# Patient Record
Sex: MALE | Race: WHITE | NOT HISPANIC OR LATINO | Employment: OTHER | URBAN - METROPOLITAN AREA
[De-identification: names, ages, dates, MRNs, and addresses within clinical notes are randomized per-mention and may not be internally consistent; named-entity substitution may affect disease eponyms.]

---

## 2018-06-08 ENCOUNTER — APPOINTMENT (OUTPATIENT)
Dept: PREADMISSION TESTING | Facility: HOSPITAL | Age: 68
End: 2018-06-08

## 2018-06-10 ENCOUNTER — APPOINTMENT (OUTPATIENT)
Dept: PREADMISSION TESTING | Facility: HOSPITAL | Age: 68
End: 2018-06-10

## 2018-06-10 ENCOUNTER — HOSPITAL ENCOUNTER (OUTPATIENT)
Dept: GENERAL RADIOLOGY | Facility: HOSPITAL | Age: 68
Discharge: HOME | End: 2018-06-10
Admitting: ORTHOPAEDIC SURGERY

## 2018-06-10 VITALS — HEIGHT: 72 IN | WEIGHT: 220.68 LBS | BODY MASS INDEX: 29.89 KG/M2

## 2018-06-10 LAB
ANION GAP SERPL CALCULATED.3IONS-SCNC: 5 MMOL/L (ref 3–11)
BUN BLD-MCNC: 13 MG/DL (ref 9–23)
BUN/CREAT SERPL: 14.8 (ref 7–25)
CALCIUM SPEC-SCNC: 9 MG/DL (ref 8.7–10.4)
CHLORIDE SERPL-SCNC: 105 MMOL/L (ref 99–109)
CO2 SERPL-SCNC: 32 MMOL/L (ref 20–31)
CREAT BLD-MCNC: 0.88 MG/DL (ref 0.6–1.3)
DEPRECATED RDW RBC AUTO: 45 FL (ref 37–54)
ERYTHROCYTE [DISTWIDTH] IN BLOOD BY AUTOMATED COUNT: 12.8 % (ref 11.3–14.5)
GFR SERPL CREATININE-BSD FRML MDRD: 86 ML/MIN/1.73
GLUCOSE BLD-MCNC: 104 MG/DL (ref 70–100)
HCT VFR BLD AUTO: 44.4 % (ref 38.9–50.9)
HGB BLD-MCNC: 15 G/DL (ref 13.1–17.5)
MCH RBC QN AUTO: 32.3 PG (ref 27–31)
MCHC RBC AUTO-ENTMCNC: 33.8 G/DL (ref 32–36)
MCV RBC AUTO: 95.7 FL (ref 80–99)
PLATELET # BLD AUTO: 240 10*3/MM3 (ref 150–450)
PMV BLD AUTO: 9.4 FL (ref 6–12)
POTASSIUM BLD-SCNC: 4.1 MMOL/L (ref 3.5–5.5)
RBC # BLD AUTO: 4.64 10*6/MM3 (ref 4.2–5.76)
SODIUM BLD-SCNC: 142 MMOL/L (ref 132–146)
WBC NRBC COR # BLD: 5.25 10*3/MM3 (ref 3.5–10.8)

## 2018-06-10 PROCEDURE — 71046 X-RAY EXAM CHEST 2 VIEWS: CPT

## 2018-06-10 PROCEDURE — 85027 COMPLETE CBC AUTOMATED: CPT | Performed by: ORTHOPAEDIC SURGERY

## 2018-06-10 PROCEDURE — 93005 ELECTROCARDIOGRAM TRACING: CPT

## 2018-06-10 PROCEDURE — 93010 ELECTROCARDIOGRAM REPORT: CPT | Performed by: INTERNAL MEDICINE

## 2018-06-10 PROCEDURE — 36415 COLL VENOUS BLD VENIPUNCTURE: CPT

## 2018-06-10 PROCEDURE — 80048 BASIC METABOLIC PNL TOTAL CA: CPT | Performed by: ORTHOPAEDIC SURGERY

## 2018-06-10 RX ORDER — ATORVASTATIN CALCIUM 20 MG/1
20 TABLET, FILM COATED ORAL DAILY
COMMUNITY

## 2018-06-10 RX ORDER — HYDROCODONE BITARTRATE AND ACETAMINOPHEN 5; 325 MG/1; MG/1
1 TABLET ORAL 2 TIMES DAILY
COMMUNITY
End: 2018-06-12 | Stop reason: HOSPADM

## 2018-06-10 RX ORDER — ENTACAPONE 200 MG/1
200 TABLET ORAL
COMMUNITY

## 2018-06-10 RX ORDER — QUETIAPINE FUMARATE 25 MG/1
125 TABLET, FILM COATED ORAL NIGHTLY
COMMUNITY

## 2018-06-10 RX ORDER — DONEPEZIL HYDROCHLORIDE 10 MG/1
10 TABLET, FILM COATED ORAL NIGHTLY
COMMUNITY

## 2018-06-10 NOTE — DISCHARGE INSTRUCTIONS

## 2018-06-12 ENCOUNTER — APPOINTMENT (OUTPATIENT)
Dept: GENERAL RADIOLOGY | Facility: HOSPITAL | Age: 68
End: 2018-06-12

## 2018-06-12 ENCOUNTER — ANESTHESIA (OUTPATIENT)
Dept: PERIOP | Facility: HOSPITAL | Age: 68
End: 2018-06-12

## 2018-06-12 ENCOUNTER — ANESTHESIA EVENT (OUTPATIENT)
Dept: PERIOP | Facility: HOSPITAL | Age: 68
End: 2018-06-12

## 2018-06-12 ENCOUNTER — HOSPITAL ENCOUNTER (OUTPATIENT)
Facility: HOSPITAL | Age: 68
Setting detail: HOSPITAL OUTPATIENT SURGERY
Discharge: HOME | End: 2018-06-12
Attending: ORTHOPAEDIC SURGERY | Admitting: ORTHOPAEDIC SURGERY

## 2018-06-12 VITALS
OXYGEN SATURATION: 93 % | SYSTOLIC BLOOD PRESSURE: 129 MMHG | BODY MASS INDEX: 29.8 KG/M2 | TEMPERATURE: 98.5 F | WEIGHT: 220 LBS | RESPIRATION RATE: 18 BRPM | DIASTOLIC BLOOD PRESSURE: 73 MMHG | HEART RATE: 70 BPM | HEIGHT: 72 IN

## 2018-06-12 DIAGNOSIS — G20 PARKINSON'S DISEASE (HCC): Primary | ICD-10-CM

## 2018-06-12 DIAGNOSIS — S52.92XA CLOSED FRACTURE OF LEFT FOREARM, INITIAL ENCOUNTER: ICD-10-CM

## 2018-06-12 PROCEDURE — 76001 HC FLUORO GREATER THAN 1 HOUR: CPT

## 2018-06-12 PROCEDURE — C1713 ANCHOR/SCREW BN/BN,TIS/BN: HCPCS | Performed by: ORTHOPAEDIC SURGERY

## 2018-06-12 PROCEDURE — 25010000003 CEFAZOLIN IN DEXTROSE 2-4 GM/100ML-% SOLUTION: Performed by: ORTHOPAEDIC SURGERY

## 2018-06-12 PROCEDURE — 25010000002 DEXAMETHASONE SODIUM PHOSPHATE 10 MG/ML SOLUTION 1 ML VIAL: Performed by: NURSE ANESTHETIST, CERTIFIED REGISTERED

## 2018-06-12 PROCEDURE — 25010000002 BUPRENORPHINE PER 0.1 MG: Performed by: NURSE ANESTHETIST, CERTIFIED REGISTERED

## 2018-06-12 PROCEDURE — 25010000002 ONDANSETRON PER 1 MG: Performed by: NURSE ANESTHETIST, CERTIFIED REGISTERED

## 2018-06-12 PROCEDURE — 76000 FLUOROSCOPY <1 HR PHYS/QHP: CPT

## 2018-06-12 PROCEDURE — 25010000002 PROPOFOL 10 MG/ML EMULSION: Performed by: NURSE ANESTHETIST, CERTIFIED REGISTERED

## 2018-06-12 PROCEDURE — C1769 GUIDE WIRE: HCPCS | Performed by: ORTHOPAEDIC SURGERY

## 2018-06-12 PROCEDURE — 25010000002 FENTANYL CITRATE (PF) 100 MCG/2ML SOLUTION: Performed by: NURSE ANESTHETIST, CERTIFIED REGISTERED

## 2018-06-12 PROCEDURE — 25010000002 DEXAMETHASONE PER 1 MG: Performed by: NURSE ANESTHETIST, CERTIFIED REGISTERED

## 2018-06-12 DEVICE — SCRW CORT S/TAP 3.5X20MM: Type: IMPLANTABLE DEVICE | Site: RADIUS | Status: FUNCTIONAL

## 2018-06-12 DEVICE — ALLOGRFT BONE VIVIGEN CELLUAR MATRX FORMABLE 1CC: Type: IMPLANTABLE DEVICE | Site: ARM | Status: FUNCTIONAL

## 2018-06-12 DEVICE — PLT LCP 8HL 3.5X111MM: Type: IMPLANTABLE DEVICE | Site: RADIUS | Status: FUNCTIONAL

## 2018-06-12 DEVICE — SCRW CORT S/TAP 3.5X18MM: Type: IMPLANTABLE DEVICE | Site: RADIUS | Status: FUNCTIONAL

## 2018-06-12 DEVICE — SCRW CORT S/TAP 3.5X16MM: Type: IMPLANTABLE DEVICE | Site: RADIUS | Status: FUNCTIONAL

## 2018-06-12 RX ORDER — EPHEDRINE SULFATE 50 MG/ML
5 INJECTION, SOLUTION INTRAVENOUS ONCE AS NEEDED
Status: DISCONTINUED | OUTPATIENT
Start: 2018-06-12 | End: 2018-06-12 | Stop reason: HOSPADM

## 2018-06-12 RX ORDER — LIDOCAINE HYDROCHLORIDE 10 MG/ML
INJECTION, SOLUTION EPIDURAL; INFILTRATION; INTRACAUDAL; PERINEURAL AS NEEDED
Status: DISCONTINUED | OUTPATIENT
Start: 2018-06-12 | End: 2018-06-12 | Stop reason: SURG

## 2018-06-12 RX ORDER — LABETALOL HYDROCHLORIDE 5 MG/ML
5 INJECTION, SOLUTION INTRAVENOUS
Status: DISCONTINUED | OUTPATIENT
Start: 2018-06-12 | End: 2018-06-12 | Stop reason: HOSPADM

## 2018-06-12 RX ORDER — OXYCODONE HYDROCHLORIDE AND ACETAMINOPHEN 5; 325 MG/1; MG/1
1 TABLET ORAL ONCE AS NEEDED
Status: DISCONTINUED | OUTPATIENT
Start: 2018-06-12 | End: 2018-06-12 | Stop reason: HOSPADM

## 2018-06-12 RX ORDER — NALOXONE HCL 0.4 MG/ML
0.4 VIAL (ML) INJECTION AS NEEDED
Status: DISCONTINUED | OUTPATIENT
Start: 2018-06-12 | End: 2018-06-12 | Stop reason: HOSPADM

## 2018-06-12 RX ORDER — OXYCODONE HYDROCHLORIDE 5 MG/1
5-10 TABLET ORAL EVERY 4 HOURS PRN
Qty: 40 TABLET | Refills: 0 | Status: ON HOLD | OUTPATIENT
Start: 2018-06-12 | End: 2018-07-11 | Stop reason: SDUPTHER

## 2018-06-12 RX ORDER — ACETAMINOPHEN 325 MG/1
650 TABLET ORAL ONCE
Status: COMPLETED | OUTPATIENT
Start: 2018-06-12 | End: 2018-06-12

## 2018-06-12 RX ORDER — LIDOCAINE HYDROCHLORIDE 10 MG/ML
0.5 INJECTION, SOLUTION EPIDURAL; INFILTRATION; INTRACAUDAL; PERINEURAL ONCE AS NEEDED
Status: COMPLETED | OUTPATIENT
Start: 2018-06-12 | End: 2018-06-12

## 2018-06-12 RX ORDER — ONDANSETRON 4 MG/1
4 TABLET, FILM COATED ORAL EVERY 8 HOURS PRN
Qty: 12 TABLET | Refills: 0 | Status: SHIPPED | OUTPATIENT
Start: 2018-06-12

## 2018-06-12 RX ORDER — MEPERIDINE HYDROCHLORIDE 25 MG/ML
12.5 INJECTION INTRAMUSCULAR; INTRAVENOUS; SUBCUTANEOUS
Status: DISCONTINUED | OUTPATIENT
Start: 2018-06-12 | End: 2018-06-12 | Stop reason: HOSPADM

## 2018-06-12 RX ORDER — ONDANSETRON 2 MG/ML
INJECTION INTRAMUSCULAR; INTRAVENOUS AS NEEDED
Status: DISCONTINUED | OUTPATIENT
Start: 2018-06-12 | End: 2018-06-12 | Stop reason: SURG

## 2018-06-12 RX ORDER — CEFAZOLIN SODIUM 2 G/100ML
2 INJECTION, SOLUTION INTRAVENOUS ONCE
Status: COMPLETED | OUTPATIENT
Start: 2018-06-12 | End: 2018-06-12

## 2018-06-12 RX ORDER — DEXAMETHASONE SODIUM PHOSPHATE 4 MG/ML
INJECTION, SOLUTION INTRA-ARTICULAR; INTRALESIONAL; INTRAMUSCULAR; INTRAVENOUS; SOFT TISSUE AS NEEDED
Status: DISCONTINUED | OUTPATIENT
Start: 2018-06-12 | End: 2018-06-12 | Stop reason: SURG

## 2018-06-12 RX ORDER — SODIUM CHLORIDE, SODIUM LACTATE, POTASSIUM CHLORIDE, CALCIUM CHLORIDE 600; 310; 30; 20 MG/100ML; MG/100ML; MG/100ML; MG/100ML
9 INJECTION, SOLUTION INTRAVENOUS CONTINUOUS PRN
Status: DISCONTINUED | OUTPATIENT
Start: 2018-06-12 | End: 2018-06-12 | Stop reason: HOSPADM

## 2018-06-12 RX ORDER — PROPOFOL 10 MG/ML
VIAL (ML) INTRAVENOUS AS NEEDED
Status: DISCONTINUED | OUTPATIENT
Start: 2018-06-12 | End: 2018-06-12 | Stop reason: SURG

## 2018-06-12 RX ORDER — MAGNESIUM HYDROXIDE 1200 MG/15ML
LIQUID ORAL AS NEEDED
Status: DISCONTINUED | OUTPATIENT
Start: 2018-06-12 | End: 2018-06-12 | Stop reason: HOSPADM

## 2018-06-12 RX ORDER — ACETAMINOPHEN 325 MG/1
650 TABLET ORAL EVERY 4 HOURS PRN
Qty: 50 TABLET | Refills: 0 | Status: SHIPPED | OUTPATIENT
Start: 2018-06-12

## 2018-06-12 RX ORDER — DOCUSATE SODIUM 100 MG/1
100 CAPSULE, LIQUID FILLED ORAL 2 TIMES DAILY PRN
Qty: 20 CAPSULE | Refills: 0 | Status: SHIPPED | OUTPATIENT
Start: 2018-06-12

## 2018-06-12 RX ORDER — ONDANSETRON 2 MG/ML
4 INJECTION INTRAMUSCULAR; INTRAVENOUS ONCE AS NEEDED
Status: DISCONTINUED | OUTPATIENT
Start: 2018-06-12 | End: 2018-06-12 | Stop reason: HOSPADM

## 2018-06-12 RX ORDER — FENTANYL CITRATE 50 UG/ML
INJECTION, SOLUTION INTRAMUSCULAR; INTRAVENOUS AS NEEDED
Status: DISCONTINUED | OUTPATIENT
Start: 2018-06-12 | End: 2018-06-12 | Stop reason: SURG

## 2018-06-12 RX ORDER — SODIUM CHLORIDE 0.9 % (FLUSH) 0.9 %
1-10 SYRINGE (ML) INJECTION AS NEEDED
Status: DISCONTINUED | OUTPATIENT
Start: 2018-06-12 | End: 2018-06-12 | Stop reason: HOSPADM

## 2018-06-12 RX ORDER — PREGABALIN 75 MG/1
75 CAPSULE ORAL ONCE
Status: COMPLETED | OUTPATIENT
Start: 2018-06-12 | End: 2018-06-12

## 2018-06-12 RX ORDER — FENTANYL CITRATE 50 UG/ML
50 INJECTION, SOLUTION INTRAMUSCULAR; INTRAVENOUS
Status: DISCONTINUED | OUTPATIENT
Start: 2018-06-12 | End: 2018-06-12 | Stop reason: HOSPADM

## 2018-06-12 RX ORDER — SODIUM CHLORIDE, SODIUM LACTATE, POTASSIUM CHLORIDE, CALCIUM CHLORIDE 600; 310; 30; 20 MG/100ML; MG/100ML; MG/100ML; MG/100ML
100 INJECTION, SOLUTION INTRAVENOUS CONTINUOUS
Status: DISCONTINUED | OUTPATIENT
Start: 2018-06-12 | End: 2018-06-12 | Stop reason: HOSPADM

## 2018-06-12 RX ORDER — MELOXICAM 7.5 MG/1
15 TABLET ORAL ONCE
Status: COMPLETED | OUTPATIENT
Start: 2018-06-12 | End: 2018-06-12

## 2018-06-12 RX ORDER — HYDROMORPHONE HYDROCHLORIDE 1 MG/ML
0.5 INJECTION, SOLUTION INTRAMUSCULAR; INTRAVENOUS; SUBCUTANEOUS
Status: DISCONTINUED | OUTPATIENT
Start: 2018-06-12 | End: 2018-06-12 | Stop reason: HOSPADM

## 2018-06-12 RX ORDER — FAMOTIDINE 20 MG/1
20 TABLET, FILM COATED ORAL
Status: DISCONTINUED | OUTPATIENT
Start: 2018-06-12 | End: 2018-06-12 | Stop reason: HOSPADM

## 2018-06-12 RX ADMIN — EPHEDRINE SULFATE 10 MG: 50 INJECTION INTRAMUSCULAR; INTRAVENOUS; SUBCUTANEOUS at 13:15

## 2018-06-12 RX ADMIN — PROPOFOL 150 MG: 10 INJECTION, EMULSION INTRAVENOUS at 11:40

## 2018-06-12 RX ADMIN — MELOXICAM 15 MG: 7.5 TABLET ORAL at 09:16

## 2018-06-12 RX ADMIN — CEFAZOLIN SODIUM 2 G: 2 INJECTION, SOLUTION INTRAVENOUS at 15:33

## 2018-06-12 RX ADMIN — FENTANYL CITRATE 100 MCG: 50 INJECTION, SOLUTION INTRAMUSCULAR; INTRAVENOUS at 11:40

## 2018-06-12 RX ADMIN — SODIUM CHLORIDE, POTASSIUM CHLORIDE, SODIUM LACTATE AND CALCIUM CHLORIDE: 600; 310; 30; 20 INJECTION, SOLUTION INTRAVENOUS at 14:10

## 2018-06-12 RX ADMIN — PREGABALIN 75 MG: 75 CAPSULE ORAL at 09:17

## 2018-06-12 RX ADMIN — CEFAZOLIN SODIUM 2 G: 2 INJECTION, SOLUTION INTRAVENOUS at 11:33

## 2018-06-12 RX ADMIN — DEXAMETHASONE SODIUM PHOSPHATE 4 MG: 4 INJECTION, SOLUTION INTRAMUSCULAR; INTRAVENOUS at 15:20

## 2018-06-12 RX ADMIN — SODIUM CHLORIDE, POTASSIUM CHLORIDE, SODIUM LACTATE AND CALCIUM CHLORIDE 9 ML/HR: 600; 310; 30; 20 INJECTION, SOLUTION INTRAVENOUS at 09:17

## 2018-06-12 RX ADMIN — ACETAMINOPHEN 650 MG: 500 TABLET, FILM COATED ORAL at 09:16

## 2018-06-12 RX ADMIN — LIDOCAINE HYDROCHLORIDE 0.5 ML: 10 INJECTION, SOLUTION EPIDURAL; INFILTRATION; INTRACAUDAL; PERINEURAL at 09:16

## 2018-06-12 RX ADMIN — ONDANSETRON 4 MG: 2 INJECTION INTRAMUSCULAR; INTRAVENOUS at 15:20

## 2018-06-12 RX ADMIN — DEXAMETHASONE SODIUM PHOSPHATE 25.7 ML: 10 INJECTION, SOLUTION INTRAMUSCULAR; INTRAVENOUS at 10:28

## 2018-06-12 RX ADMIN — LIDOCAINE HYDROCHLORIDE 30 MG: 10 INJECTION, SOLUTION EPIDURAL; INFILTRATION; INTRACAUDAL; PERINEURAL at 11:40

## 2018-06-12 RX ADMIN — FENTANYL CITRATE 100 MCG: 50 INJECTION, SOLUTION INTRAMUSCULAR; INTRAVENOUS at 10:28

## 2018-06-12 RX ADMIN — FAMOTIDINE 20 MG: 20 TABLET ORAL at 09:17

## 2018-06-12 NOTE — H&P
Patient Care Team:      Chief complaint: arm pain     Subjective:    Patient is a 67 y.o.male presents with left arm problem that began suddenly on June 4, 2018 and occurs intermittently. . The pain is related to a fall. He is here today for surgical intervention with Dr. Williamson.    Review of Systems:  General ROS: negative for - chills, fatigue or fever  Cardiovascular ROS: no chest pain or dyspnea on exertion  Respiratory ROS: no cough, shortness of breath, or wheezing      Allergies:   Allergies   Allergen Reactions   • Shellfish-Derived Products Other (See Comments)     DESCRIBES IT AS A TINGLING IN HIS MOUTH - NO ASSOCIATED SWELLING     • Shrimp (Diagnostic) Other (See Comments)     DESCRIBES IT AS A TINGLING IN HIS MOUTH - NO ASSOCIATED SWELLING  IODINE AND CONTRAST CAUSES NO PROBLEMS            Latex: Denies  Contrast Dye: No but does have shellfish allergy    Home Meds    Prescriptions Prior to Admission   Medication Sig Dispense Refill Last Dose   • atorvastatin (LIPITOR) 20 MG tablet Take 20 mg by mouth Daily.   6/11/2018 at Unknown time   • carbidopa-levodopa (SINEMET)  MG per tablet Take 3.5 tablets by mouth 3 (Three) Times a Day.   6/12/2018 at Unknown time   • donepezil (ARICEPT) 10 MG tablet Take 10 mg by mouth Every Night.   6/12/2018 at Unknown time   • entacapone (COMTAN) 200 MG tablet Take 200 mg by mouth 5 (Five) Times a Day.   6/12/2018 at Unknown time   • HYDROcodone-acetaminophen (NORCO) 5-325 MG per tablet Take 1 tablet by mouth 2 (Two) Times a Day.   6/11/2018 at Unknown time   • Multiple Vitamins-Minerals (MULTIVITAL-M PO) Take 1 tablet by mouth Daily.   6/12/2018 at Unknown time   • QUEtiapine (SEROquel) 25 MG tablet Take 125 mg by mouth Every Night.   6/11/2018 at Unknown time     PMH:   Past Medical History:   Diagnosis Date   • Arthritis    • Elevated cholesterol    • EBTI (obstructive sleep apnea)     NO CPAP   • Parkinson disease    • Wears glasses      PSH:    Past Surgical  "History:   Procedure Laterality Date   • COLONOSCOPY     • CYST REMOVAL      ON BACK   • DEEP BRAIN STIMULATOR PLACEMENT      placed to increase dopamine production to help reduce tremors   • HAND SURGERY Left    • OTHER SURGICAL HISTORY      DBT BATTERY CHANGE - battery placed on left chest   • WISDOM TOOTH EXTRACTION       Immunization History: pneumo: No  Flu: 2017  Tetanus: No  Social History:   Tobacco: Never    Alcohol: No      Physical Exam:/76 (BP Location: Right arm, Patient Position: Lying)   Pulse 58   Temp 98.1 °F (36.7 °C) (Tympanic)   Resp 18   Ht 182.9 cm (72\")   Wt 99.8 kg (220 lb)   SpO2 97%   BMI 29.84 kg/m²       General Appearance:    Alert, cooperative, no distress, appears stated age   Head:    Normocephalic, without obvious abnormality, atraumatic   Lungs:     Clear to auscultation bilaterally, respirations unlabored    Heart: Regular rate and rhythm, S1 and S2 normal, no murmur, rub    or gallop    Abdomen:    Soft without tenderness   Breast Exam:    deferred   Genitalia:    deferred   Extremities:   Extremities normal, atraumatic, no cyanosis or edema   Skin:   Skin color, texture, turgor normal, no rashes or lesions   Neurologic:   Grossly intact     Results Review: Labs were reviewed       Impression: Galeazzi's fracture of left radius    Plan: Radius shaft ORIF     SARAY Escobedo 6/12/2018 10:19 AM    Seen and agree with above note. Patient to undergo ORIF L radial shaft with evaluation and possible pinning of DRUJ. NPO. Posted/consented/marked. To OR today.      "

## 2018-06-12 NOTE — ANESTHESIA POSTPROCEDURE EVALUATION
Patient: Sebastián Price    Procedure Summary     Date:  06/12/18 Room / Location:   [facility]    Anesthesia Start:  1133 Anesthesia Stop:      Procedure:  RADIUS SHAFT OPEN REDUCTION INTERNAL FIXATION LEFT, FELICIA J PINNING (Left Hand) Diagnosis:      Surgeon:  Kristian Williamson Jr., MD Provider:  Carlos Rijoas MD    Anesthesia Type:  general, regional ASA Status:  3          Anesthesia Type: general, regional  Last vitals  BP   125/76 (06/12/18 0908)126/74   Temp   98.1 °F (36.7 °C) (06/12/18 0908)99   Pulse   58 (06/12/18 0908)75   Resp   18 (06/12/18 0908)  18   SpO2   97 % (06/12/18 0908)94     Post Anesthesia Care and Evaluation    Patient location during evaluation: PACU  Patient participation: complete - patient participated  Level of consciousness: awake and alert  Pain score: 0  Pain management: adequate  Airway patency: patent  Anesthetic complications: No anesthetic complications  PONV Status: none  Cardiovascular status: hemodynamically stable and acceptable  Respiratory status: nonlabored ventilation, acceptable and nasal cannula  Hydration status: acceptable

## 2018-06-12 NOTE — ANESTHESIA PROCEDURE NOTES
Airway  Urgency: elective    Airway not difficult    General Information and Staff    Patient location during procedure: OR  Anesthesiologist: MICHELLE SCOTT  CRNA: MICHELLE BAKER    Indications and Patient Condition  Indications for airway management: airway protection    Preoxygenated: yes  Mask difficulty assessment: 1 - vent by mask    Final Airway Details  Final airway type: supraglottic airway      Successful airway: I-gel  Size 4    Number of attempts at approach: 1    Additional Comments  LMA placed without difficulty, ventilation with assist, equal breath sounds and symmetric chest rise and fall

## 2018-06-12 NOTE — ANESTHESIA PROCEDURE NOTES
Peripheral Block    Patient location during procedure: pre-op  Reason for block: at surgeon's request and post-op pain management  Performed by  Anesthesiologist: MICHELLE SCOTT  Assisted by: MATT JAY  Preanesthetic Checklist  Completed: patient identified, site marked, surgical consent, pre-op evaluation, timeout performed, IV checked, risks and benefits discussed and monitors and equipment checked  Prep:  Sterile barriers:cap, gloves, mask and sterile barriers  Prep: ChloraPrep  Patient monitoring: blood pressure monitoring, continuous pulse oximetry and EKG  Procedure  Sedation:yes    Guidance:ultrasound guided  Images:still images obtained    Laterality:left  Block Type:infraclavicular  Injection Technique:single-shot  Needle Type:Tuohy  Needle Gauge:18 G    Catheter Size:20 G  Medications  Comment:Decadron 2mg  Buprenex 0.15mg  Local Injected:bupivacaine 0.25% Local Amount Injected:25mL  Post Assessment  Injection Assessment: negative aspiration for heme, no paresthesia on injection and incremental injection  Patient Tolerance:comfortable throughout block  Complications:no  Additional Notes  Procedure:                                                 The affected upper extremity was adducted within the patients ROM.  The US probe was placed approximately at the distal inferior third of the clavicle in a cephalad to caudad orientation.  The brachial plexus, subclavian artery and vein where visualized and the patient was marked and sterile prep and drape where completed.  The pt was anesthetized with  IV Sedation( see meds).  Skin and cutaneous tissue was infiltrated and anesthetized with 1% Lidocaine 3 mls via a 25g needle.   A 4 inch B-Menon echogenic Touhy 360 degree needle was placed inferiorly to the clavicle in a caudad direction, in plane US technique.  The needle was visualized as it passed through Pectoralis Major and to the posterior aspect of the artery where LA was placed and spread was  visualized. Injection of LA was incremental, and negative aspiration every 5 MLs.  Injection pressure was also noted as minimal and patient denied pain on injection.   A 20 gauge catheter was then placed through the needle and positioned and location was confirmed with injection of LA.  Thank you

## 2018-06-12 NOTE — PERIOPERATIVE NURSING NOTE
Called Case management, spoke with Macrina White about Home Health referral order. Per Macrina, someone will call patient tomorrow once they have looked over insurance. RN will notify family upon discharge.

## 2018-06-13 NOTE — OP NOTE
ULNA/RADIUS OPEN REDUCTION INTERNAL FIXATION  Procedure Report    Patient Name:  Sebastián Price  YOB: 1950    Date of Surgery:  6/12/2018     Indications:  This is a 67-year-old male with history of Parkinson's disease who presents after having a nightmare and jumping out of his bed and falling onto an outstretched left upper extremity with resultant left forearm and wrist pain.  He denied any elbow pain.  He was seen at an outside hospital and orthopedist who referred him to me for further evaluation.  X-rays and exam were consistent with a left Galeazzi-type radial shaft fracture with disruption of the DRUJ.  We discussed the risk and benefits of performing an open reduction internal  Fixation of his left radial shaft with evaluation of his DRUJ and possible pinning.  Nonoperative treatments were discussed, but due to his significant pain and dysfunction, he wished to proceed with operative management. The risks and benefits were discussed with the patient to include: bleeding, infection, nerve injury, failure of fixation, need for further procedures, loss of limb or life. The patient expressed his understanding and   Wished to proceed.      Pre-op Diagnosis:  Left radius Galeazzi fracture    S52.372A  Left DRUJ disruption S63.592A    Post-op Diagnosis:       same    Procedure/CPT® Codes: 70140      Procedure(s):  RADIUS SHAFT OPEN REDUCTION INTERNAL FIXATION LEFT, DRUJ PINNING    Staff:  Surgeon(s):  Kristian Williamson Jr., MD         Anesthesia: General with Block    Estimated Blood Loss: 125 mL    Implants:      Implant Name Type Inv. Item Serial No.  Lot No. LRB No. Used   ALLOGRFT BONE VIVIGEN CELLUAR MATRX FORMABLE 1CC - [id number] Implant ALLOGRFT BONE VIVIGEN CELLUAR MATRX FORMABLE 1CC  [employer]  Left 1   PLT LCP 8HL [id number] - [id number] Implant PLT LCP 8HL [id number]  [employer]  Left 1   SCRW EB S/TAP 3.5X16MM - [id number] Implant SCRW EB S/TAP 3.5X16MM  DEPUY  [employer]  Left 2   SCRW EB S/TAP 3.5X18MM - [id number] Implant SCRW EB S/TAP 3.5X18MM  DEPU[employer]  Left 2   SCRW EB S/TAP 3.5X20MM - [id number] Implant SCRW EB S/TAP 3.5X20MM   DEPU[employer]   Left 2       Specimen:                None      Findings: comminuted radial shaft fracture with gross instability of the DRUJ after fixation of the radial shaft fracture    Total tourniquet time: 2 hours    Complications: none apparent    Description of Procedure: After informed consent had been obtained in the left upper extremity had been marked, the patient receives an infraclavicular nerve block.  He was then taken back to the operating suite And placed on the operating table with a hand table extension.  The patient then underwent general anesthesia and was intubated.  A nonsterile tourniquet was placed to the left upper arm.We then prepped and draped the left upper extremity in the usual, sterile fashion.A timeout was then performed with the entire surgical staff present.    We began the procedure by first making a 14 cm incision on the volar aspect of the forearm to perform a standard Jalen approach to the radius.  The incision began approximately 4 cm proximal to the radial styloid and travelled proximally.  Sharp dissection was carried down to the fascia.  The fascia was then incised and extended along her incision line.  Care was taken to avoid injury to the radial artery which was retracted medially throughout the case.  There were multiple feeder vessels to the brachioradialis muscle belly which were either tied off with 2-0 silk or cauterized.  Superficial radial nerve was identified under the brachioradialis and protected throughout the case.  Once developing the plane distally between the brachioradialis and FCR, we then traveled proximally developing the interval between the pronator teres and brachioradialis.  The forearm was pronated fully and the pronator teres was elevated subperiosteally off the  radial shaft.  We then returned our attention distally and supinated the forearm.  The pronator quadratus was identified as well as the FPL, both of which were elevated and a lateral to medial fashion to expose the radius in its entirety for fixation of the fracture.  The fracture was then identified and curetted and irrigated thoroughly. It was noted it upon examination of the fracture but there were multiple comminuted fragments and a small butterfly fragment located on the ulnar border as well.  We have these fragments had been impacted into the canal and were unreconstructable.  We, thus, essentially had one remaining cortex to help establish the appropriate length, alignment, and rotation.  We placed a 8 hole 3.5 mm LCP plate to the volar aspect of the radius, centering the middle 2 holes on the fracture site.  We placed one 3.5 mm screw in a bicortical fashion to the distal aspect of the fracture and plate construct.  The proximal aspect of fracture was then reduced to the plate and one 3.5 mm screw was placed on the eccentric portion of the screw hole, so as to compress the fracture.  The plate had been bent minimally, so as to not gap the fracture.  Reduction of the fracture was confirmed both visually and fluoroscopically.  We then placed 2 additional bicortical screws in the distal and proximal aspects of the plate.  Due to the bone void that had been left along the dorsal aspect of the fracture, we packed 1 cc of vivigen bone graft into the bone void.  At this point reevaluated the fracture visually and fluoroscopically, noting restoration of appropriate length, alignment, and rotation of the radius.  Evaluation of the DRUJ after restoration of the radial fracture demonstrated continued gross instability of the DRUJ.  We, thus, reduced the DRUJ and pinned it in place a percutaneous fashion traveling ulnarly to radially With a 2.0 mm K wire.  We confirmed adequate reduction of the DRUJ using fluoroscopy.   The pin was then cut. We then irrigated the wound thoroughly with normal saline solution and obtained hemostasis.  The fascia was then closed with 3-0 Vicryl suture and the subcutaneous tissue was closed with 3-0 Vicryl suture in a buried subcutaneous fashion.  Skin was then closed with 3-0 nylon suture in a horizontal mattress fashion.  Sterile dressing was then applied.  Patient was then placed in a well-padded sugar tong splint.     The patient was then awakened from anesthesia, extubated, transferred to the [facility], and transferred to the post anesthesia care unit in stable condition.    The plan for Mr. Price is that he will be nonweightbearing to the left upper extremity.  He will follow up in 2 weeks' time for removal of the splint and sutures with postoperative films.  We will Likely continue his DRUJ pin and casting for 6 weeks' time.        Kristian Williamson Jr., MD     Date: 6/12/2018  Time: 8:50 PM

## 2018-07-09 ENCOUNTER — ANESTHESIA EVENT (OUTPATIENT)
Dept: PERIOP | Facility: HOSPITAL | Age: 68
End: 2018-07-09

## 2018-07-09 RX ORDER — FAMOTIDINE 10 MG/ML
20 INJECTION, SOLUTION INTRAVENOUS ONCE
Status: CANCELLED | OUTPATIENT
Start: 2018-07-09 | End: 2018-07-09

## 2018-07-10 ENCOUNTER — HOSPITAL ENCOUNTER (OUTPATIENT)
Facility: HOSPITAL | Age: 68
Discharge: HOME | End: 2018-07-11
Attending: ORTHOPAEDIC SURGERY | Admitting: ORTHOPAEDIC SURGERY

## 2018-07-10 ENCOUNTER — APPOINTMENT (OUTPATIENT)
Dept: GENERAL RADIOLOGY | Facility: HOSPITAL | Age: 68
End: 2018-07-10

## 2018-07-10 ENCOUNTER — ANESTHESIA (OUTPATIENT)
Dept: PERIOP | Facility: HOSPITAL | Age: 68
End: 2018-07-10

## 2018-07-10 DIAGNOSIS — Z98.890 S/P WRIST SURGERY: Primary | ICD-10-CM

## 2018-07-10 PROBLEM — G47.33 OSA (OBSTRUCTIVE SLEEP APNEA): Status: ACTIVE | Noted: 2018-07-10

## 2018-07-10 PROBLEM — E78.5 HLD (HYPERLIPIDEMIA): Status: ACTIVE | Noted: 2018-07-10

## 2018-07-10 PROBLEM — G20 PARKINSON DISEASE (HCC): Status: ACTIVE | Noted: 2018-07-10

## 2018-07-10 PROBLEM — T84.7XXA INFECTION AT SITE OF EXTERNAL FIXATOR PIN (HCC): Status: ACTIVE | Noted: 2018-07-10

## 2018-07-10 LAB
ANION GAP SERPL CALCULATED.3IONS-SCNC: 4 MMOL/L (ref 3–11)
BASOPHILS # BLD AUTO: 0.03 10*3/MM3 (ref 0–0.2)
BASOPHILS NFR BLD AUTO: 0.5 % (ref 0–1)
BUN BLD-MCNC: 13 MG/DL (ref 9–23)
BUN/CREAT SERPL: 13.7 (ref 7–25)
CALCIUM SPEC-SCNC: 9.5 MG/DL (ref 8.7–10.4)
CHLORIDE SERPL-SCNC: 108 MMOL/L (ref 99–109)
CO2 SERPL-SCNC: 27 MMOL/L (ref 20–31)
CREAT BLD-MCNC: 0.95 MG/DL (ref 0.6–1.3)
DEPRECATED RDW RBC AUTO: 45.3 FL (ref 37–54)
EOSINOPHIL # BLD AUTO: 0.3 10*3/MM3 (ref 0–0.3)
EOSINOPHIL NFR BLD AUTO: 5 % (ref 0–3)
ERYTHROCYTE [DISTWIDTH] IN BLOOD BY AUTOMATED COUNT: 12.9 % (ref 11.3–14.5)
GFR SERPL CREATININE-BSD FRML MDRD: 79 ML/MIN/1.73
GLUCOSE BLD-MCNC: 119 MG/DL (ref 70–100)
HCT VFR BLD AUTO: 42.2 % (ref 38.9–50.9)
HGB BLD-MCNC: 13.9 G/DL (ref 13.1–17.5)
IMM GRANULOCYTES # BLD: 0.01 10*3/MM3 (ref 0–0.03)
IMM GRANULOCYTES NFR BLD: 0.2 % (ref 0–0.6)
LYMPHOCYTES # BLD AUTO: 2.11 10*3/MM3 (ref 0.6–4.8)
LYMPHOCYTES NFR BLD AUTO: 35.3 % (ref 24–44)
MCH RBC QN AUTO: 31.7 PG (ref 27–31)
MCHC RBC AUTO-ENTMCNC: 32.9 G/DL (ref 32–36)
MCV RBC AUTO: 96.3 FL (ref 80–99)
MONOCYTES # BLD AUTO: 0.65 10*3/MM3 (ref 0–1)
MONOCYTES NFR BLD AUTO: 10.9 % (ref 0–12)
NEUTROPHILS # BLD AUTO: 2.87 10*3/MM3 (ref 1.5–8.3)
NEUTROPHILS NFR BLD AUTO: 48.1 % (ref 41–71)
PLATELET # BLD AUTO: 285 10*3/MM3 (ref 150–450)
PMV BLD AUTO: 9.1 FL (ref 6–12)
POTASSIUM BLD-SCNC: 4.3 MMOL/L (ref 3.5–5.5)
RBC # BLD AUTO: 4.38 10*6/MM3 (ref 4.2–5.76)
SODIUM BLD-SCNC: 139 MMOL/L (ref 132–146)
WBC NRBC COR # BLD: 5.97 10*3/MM3 (ref 3.5–10.8)

## 2018-07-10 PROCEDURE — 25010000002 VANCOMYCIN: Performed by: ORTHOPAEDIC SURGERY

## 2018-07-10 PROCEDURE — 94799 UNLISTED PULMONARY SVC/PX: CPT

## 2018-07-10 PROCEDURE — 25010000002 DEXAMETHASONE PER 1 MG: Performed by: NURSE ANESTHETIST, CERTIFIED REGISTERED

## 2018-07-10 PROCEDURE — 76000 FLUOROSCOPY <1 HR PHYS/QHP: CPT

## 2018-07-10 PROCEDURE — 85025 COMPLETE CBC W/AUTO DIFF WBC: CPT | Performed by: ORTHOPAEDIC SURGERY

## 2018-07-10 PROCEDURE — G0378 HOSPITAL OBSERVATION PER HR: HCPCS

## 2018-07-10 PROCEDURE — 25010000002 PROPOFOL 10 MG/ML EMULSION: Performed by: NURSE ANESTHETIST, CERTIFIED REGISTERED

## 2018-07-10 PROCEDURE — 80048 BASIC METABOLIC PNL TOTAL CA: CPT | Performed by: ORTHOPAEDIC SURGERY

## 2018-07-10 PROCEDURE — 25010000002 KETOROLAC TROMETHAMINE PER 15 MG: Performed by: NURSE ANESTHETIST, CERTIFIED REGISTERED

## 2018-07-10 RX ORDER — SODIUM CHLORIDE 0.9 % (FLUSH) 0.9 %
1-10 SYRINGE (ML) INJECTION AS NEEDED
Status: DISCONTINUED | OUTPATIENT
Start: 2018-07-10 | End: 2018-07-10 | Stop reason: HOSPADM

## 2018-07-10 RX ORDER — ENTACAPONE 200 MG/1
200 TABLET ORAL
Status: DISCONTINUED | OUTPATIENT
Start: 2018-07-10 | End: 2018-07-11 | Stop reason: HOSPADM

## 2018-07-10 RX ORDER — CLONAZEPAM 0.5 MG/1
0.5 TABLET ORAL NIGHTLY
Status: DISCONTINUED | OUTPATIENT
Start: 2018-07-10 | End: 2018-07-11 | Stop reason: HOSPADM

## 2018-07-10 RX ORDER — LIDOCAINE HYDROCHLORIDE 10 MG/ML
0.5 INJECTION, SOLUTION EPIDURAL; INFILTRATION; INTRACAUDAL; PERINEURAL ONCE AS NEEDED
Status: DISCONTINUED | OUTPATIENT
Start: 2018-07-10 | End: 2018-07-10 | Stop reason: HOSPADM

## 2018-07-10 RX ORDER — DOCUSATE SODIUM 100 MG/1
100 CAPSULE, LIQUID FILLED ORAL 2 TIMES DAILY PRN
Status: DISCONTINUED | OUTPATIENT
Start: 2018-07-10 | End: 2018-07-11 | Stop reason: HOSPADM

## 2018-07-10 RX ORDER — LIDOCAINE HYDROCHLORIDE 10 MG/ML
0.5 INJECTION, SOLUTION EPIDURAL; INFILTRATION; INTRACAUDAL; PERINEURAL ONCE AS NEEDED
Status: COMPLETED | OUTPATIENT
Start: 2018-07-10 | End: 2018-07-10

## 2018-07-10 RX ORDER — OXYCODONE HYDROCHLORIDE 5 MG/1
5 TABLET ORAL EVERY 4 HOURS PRN
Status: DISCONTINUED | OUTPATIENT
Start: 2018-07-10 | End: 2018-07-11 | Stop reason: HOSPADM

## 2018-07-10 RX ORDER — ONDANSETRON 4 MG/1
4 TABLET, FILM COATED ORAL EVERY 8 HOURS PRN
Status: DISCONTINUED | OUTPATIENT
Start: 2018-07-10 | End: 2018-07-11 | Stop reason: HOSPADM

## 2018-07-10 RX ORDER — SODIUM CHLORIDE, SODIUM LACTATE, POTASSIUM CHLORIDE, CALCIUM CHLORIDE 600; 310; 30; 20 MG/100ML; MG/100ML; MG/100ML; MG/100ML
9 INJECTION, SOLUTION INTRAVENOUS CONTINUOUS
Status: DISCONTINUED | OUTPATIENT
Start: 2018-07-10 | End: 2018-07-10

## 2018-07-10 RX ORDER — ACETAMINOPHEN 325 MG/1
650 TABLET ORAL EVERY 4 HOURS PRN
Status: DISCONTINUED | OUTPATIENT
Start: 2018-07-10 | End: 2018-07-11 | Stop reason: HOSPADM

## 2018-07-10 RX ORDER — DONEPEZIL HYDROCHLORIDE 10 MG/1
10 TABLET, FILM COATED ORAL NIGHTLY
Status: DISCONTINUED | OUTPATIENT
Start: 2018-07-10 | End: 2018-07-11 | Stop reason: HOSPADM

## 2018-07-10 RX ORDER — ATORVASTATIN CALCIUM 20 MG/1
20 TABLET, FILM COATED ORAL DAILY
Status: DISCONTINUED | OUTPATIENT
Start: 2018-07-10 | End: 2018-07-11 | Stop reason: HOSPADM

## 2018-07-10 RX ORDER — LABETALOL HYDROCHLORIDE 5 MG/ML
5 INJECTION, SOLUTION INTRAVENOUS
Status: DISCONTINUED | OUTPATIENT
Start: 2018-07-10 | End: 2018-07-10 | Stop reason: HOSPADM

## 2018-07-10 RX ORDER — PROMETHAZINE HYDROCHLORIDE 25 MG/1
25 SUPPOSITORY RECTAL ONCE AS NEEDED
Status: DISCONTINUED | OUTPATIENT
Start: 2018-07-10 | End: 2018-07-10 | Stop reason: HOSPADM

## 2018-07-10 RX ORDER — PROMETHAZINE HYDROCHLORIDE 25 MG/ML
6.25 INJECTION, SOLUTION INTRAMUSCULAR; INTRAVENOUS ONCE AS NEEDED
Status: DISCONTINUED | OUTPATIENT
Start: 2018-07-10 | End: 2018-07-10 | Stop reason: HOSPADM

## 2018-07-10 RX ORDER — DEXAMETHASONE SODIUM PHOSPHATE 4 MG/ML
INJECTION, SOLUTION INTRA-ARTICULAR; INTRALESIONAL; INTRAMUSCULAR; INTRAVENOUS; SOFT TISSUE AS NEEDED
Status: DISCONTINUED | OUTPATIENT
Start: 2018-07-10 | End: 2018-07-10 | Stop reason: SURG

## 2018-07-10 RX ORDER — PROMETHAZINE HYDROCHLORIDE 25 MG/1
25 TABLET ORAL ONCE AS NEEDED
Status: DISCONTINUED | OUTPATIENT
Start: 2018-07-10 | End: 2018-07-10 | Stop reason: HOSPADM

## 2018-07-10 RX ORDER — HYDROCODONE BITARTRATE AND ACETAMINOPHEN 7.5; 325 MG/1; MG/1
1 TABLET ORAL ONCE AS NEEDED
Status: DISCONTINUED | OUTPATIENT
Start: 2018-07-10 | End: 2018-07-10 | Stop reason: HOSPADM

## 2018-07-10 RX ORDER — KETOROLAC TROMETHAMINE 30 MG/ML
INJECTION, SOLUTION INTRAMUSCULAR; INTRAVENOUS AS NEEDED
Status: DISCONTINUED | OUTPATIENT
Start: 2018-07-10 | End: 2018-07-10 | Stop reason: SURG

## 2018-07-10 RX ORDER — HYDROMORPHONE HYDROCHLORIDE 1 MG/ML
0.5 INJECTION, SOLUTION INTRAMUSCULAR; INTRAVENOUS; SUBCUTANEOUS
Status: DISCONTINUED | OUTPATIENT
Start: 2018-07-10 | End: 2018-07-10 | Stop reason: HOSPADM

## 2018-07-10 RX ORDER — FENTANYL CITRATE 50 UG/ML
50 INJECTION, SOLUTION INTRAMUSCULAR; INTRAVENOUS
Status: DISCONTINUED | OUTPATIENT
Start: 2018-07-10 | End: 2018-07-10 | Stop reason: HOSPADM

## 2018-07-10 RX ORDER — ONDANSETRON 2 MG/ML
4 INJECTION INTRAMUSCULAR; INTRAVENOUS ONCE AS NEEDED
Status: DISCONTINUED | OUTPATIENT
Start: 2018-07-10 | End: 2018-07-10 | Stop reason: HOSPADM

## 2018-07-10 RX ORDER — NALOXONE HCL 0.4 MG/ML
0.4 VIAL (ML) INJECTION AS NEEDED
Status: DISCONTINUED | OUTPATIENT
Start: 2018-07-10 | End: 2018-07-10 | Stop reason: HOSPADM

## 2018-07-10 RX ORDER — HYDROCODONE BITARTRATE AND ACETAMINOPHEN 5; 325 MG/1; MG/1
1 TABLET ORAL ONCE AS NEEDED
Status: DISCONTINUED | OUTPATIENT
Start: 2018-07-10 | End: 2018-07-10 | Stop reason: HOSPADM

## 2018-07-10 RX ORDER — PROPOFOL 10 MG/ML
VIAL (ML) INTRAVENOUS AS NEEDED
Status: DISCONTINUED | OUTPATIENT
Start: 2018-07-10 | End: 2018-07-10 | Stop reason: SURG

## 2018-07-10 RX ORDER — SODIUM CHLORIDE 0.9 % (FLUSH) 0.9 %
1-10 SYRINGE (ML) INJECTION AS NEEDED
Status: DISCONTINUED | OUTPATIENT
Start: 2018-07-10 | End: 2018-07-11 | Stop reason: HOSPADM

## 2018-07-10 RX ORDER — FAMOTIDINE 20 MG/1
20 TABLET, FILM COATED ORAL ONCE
Status: COMPLETED | OUTPATIENT
Start: 2018-07-10 | End: 2018-07-10

## 2018-07-10 RX ORDER — HYDRALAZINE HYDROCHLORIDE 20 MG/ML
5 INJECTION INTRAMUSCULAR; INTRAVENOUS
Status: DISCONTINUED | OUTPATIENT
Start: 2018-07-10 | End: 2018-07-10 | Stop reason: HOSPADM

## 2018-07-10 RX ORDER — MAGNESIUM HYDROXIDE 1200 MG/15ML
LIQUID ORAL AS NEEDED
Status: DISCONTINUED | OUTPATIENT
Start: 2018-07-10 | End: 2018-07-10 | Stop reason: HOSPADM

## 2018-07-10 RX ORDER — IPRATROPIUM BROMIDE AND ALBUTEROL SULFATE 2.5; .5 MG/3ML; MG/3ML
3 SOLUTION RESPIRATORY (INHALATION) ONCE AS NEEDED
Status: DISCONTINUED | OUTPATIENT
Start: 2018-07-10 | End: 2018-07-10 | Stop reason: HOSPADM

## 2018-07-10 RX ORDER — MEPERIDINE HYDROCHLORIDE 25 MG/ML
12.5 INJECTION INTRAMUSCULAR; INTRAVENOUS; SUBCUTANEOUS
Status: DISCONTINUED | OUTPATIENT
Start: 2018-07-10 | End: 2018-07-10 | Stop reason: HOSPADM

## 2018-07-10 RX ADMIN — SODIUM CHLORIDE, POTASSIUM CHLORIDE, SODIUM LACTATE AND CALCIUM CHLORIDE 9 ML/HR: 600; 310; 30; 20 INJECTION, SOLUTION INTRAVENOUS at 11:00

## 2018-07-10 RX ADMIN — DEXAMETHASONE SODIUM PHOSPHATE 4 MG: 4 INJECTION, SOLUTION INTRAMUSCULAR; INTRAVENOUS at 12:34

## 2018-07-10 RX ADMIN — ENTACAPONE 200 MG: 200 TABLET, FILM COATED ORAL at 18:56

## 2018-07-10 RX ADMIN — CARBIDOPA AND LEVODOPA 3.5 TABLET: 25; 100 TABLET ORAL at 21:20

## 2018-07-10 RX ADMIN — PROPOFOL 100 MG: 10 INJECTION, EMULSION INTRAVENOUS at 12:09

## 2018-07-10 RX ADMIN — KETOROLAC TROMETHAMINE 15 MG: 30 INJECTION, SOLUTION INTRAMUSCULAR at 12:34

## 2018-07-10 RX ADMIN — ATORVASTATIN CALCIUM 20 MG: 20 TABLET, FILM COATED ORAL at 21:19

## 2018-07-10 RX ADMIN — CARBIDOPA AND LEVODOPA 3.5 TABLET: 25; 100 TABLET ORAL at 18:56

## 2018-07-10 RX ADMIN — ENTACAPONE 200 MG: 200 TABLET, FILM COATED ORAL at 16:43

## 2018-07-10 RX ADMIN — QUETIAPINE FUMARATE 125 MG: 100 TABLET ORAL at 21:19

## 2018-07-10 RX ADMIN — ENTACAPONE 200 MG: 200 TABLET, FILM COATED ORAL at 21:19

## 2018-07-10 RX ADMIN — FAMOTIDINE 20 MG: 20 TABLET ORAL at 10:57

## 2018-07-10 RX ADMIN — CLONAZEPAM 0.5 MG: 0.5 TABLET ORAL at 21:19

## 2018-07-10 RX ADMIN — LIDOCAINE HYDROCHLORIDE 0.3 ML: 10 INJECTION, SOLUTION EPIDURAL; INFILTRATION; INTRACAUDAL; PERINEURAL at 10:58

## 2018-07-10 RX ADMIN — VANCOMYCIN HYDROCHLORIDE 1500 MG: 10 INJECTION, POWDER, LYOPHILIZED, FOR SOLUTION INTRAVENOUS at 11:23

## 2018-07-10 RX ADMIN — CARBIDOPA AND LEVODOPA 3.5 TABLET: 25; 100 TABLET ORAL at 16:42

## 2018-07-10 RX ADMIN — DONEPEZIL HYDROCHLORIDE 10 MG: 10 TABLET, FILM COATED ORAL at 21:19

## 2018-07-10 NOTE — ANESTHESIA PROCEDURE NOTES
Airway  Urgency: elective    Date/Time: 7/10/2018 12:10 PM  Airway not difficult    General Information and Staff    Patient location during procedure: OR  CRNA: WENDY MAYFIELD    Indications and Patient Condition  Indications for airway management: airway protection    Preoxygenated: yes  Mask difficulty assessment: 1 - vent by mask    Final Airway Details  Final airway type: supraglottic airway      Successful airway: I-gel  Size 4    Number of attempts at approach: 1    Additional Comments  LMA placed without difficulty, ventilation with assist, equal breath sounds and symmetric chest rise and fall

## 2018-07-10 NOTE — PLAN OF CARE
Problem: Patient Care Overview  Goal: Plan of Care Review  Outcome: Ongoing (interventions implemented as appropriate)   07/10/18 1942   Coping/Psychosocial   Plan of Care Reviewed With patient;spouse   Plan of Care Review   Progress no change   OTHER   Outcome Summary Vitals WNL. Pt tolerates ambulation with assist X 1. Shuffling gait r/t parkinson's disease. Tremors present in all extremities. Splint in place. Clean, dry, and intact. No complaints of pain. No numbness or tingling. Pt voids spontaneously. Urine noted to be orange in color.        Problem: Fall Risk (Adult)  Goal: Identify Related Risk Factors and Signs and Symptoms  Outcome: Ongoing (interventions implemented as appropriate)   07/10/18 1942   Fall Risk (Adult)   Related Risk Factors (Fall Risk) gait/mobility problems;history of falls;environment unfamiliar   Signs and Symptoms (Fall Risk) presence of risk factors     Goal: Absence of Fall  Outcome: Ongoing (interventions implemented as appropriate)   07/10/18 1942   Fall Risk (Adult)   Absence of Fall making progress toward outcome       Problem: Surgery Nonspecified (Adult)  Goal: Signs and Symptoms of Listed Potential Problems Will be Absent, Minimized or Managed (Surgery Nonspecified)  Outcome: Ongoing (interventions implemented as appropriate)   07/10/18 1942   Goal/Outcome Evaluation   Problems Assessed (Surgery) all   Problems Present (Surgery) none

## 2018-07-10 NOTE — ANESTHESIA PREPROCEDURE EVALUATION
Anesthesia Evaluation     Patient summary reviewed and Nursing notes reviewed   NPO Solid Status: > 8 hours  NPO Liquid Status: > 8 hours           Airway   Mallampati: I  TM distance: >3 FB  Neck ROM: full  No difficulty expected  Dental      Pulmonary     breath sounds clear to auscultation  (+) sleep apnea,   Cardiovascular     Rhythm: regular  Rate: normal    (+) hyperlipidemia,       Neuro/Psych  (+) tremors,     GI/Hepatic/Renal/Endo      Musculoskeletal     Abdominal    Substance History      OB/GYN          Other   (+) arthritis     ROS/Med Hx Other: Resting tremor                  Anesthesia Plan    ASA 2     general     intravenous induction   Anesthetic plan and risks discussed with patient.    Plan discussed with CRNA.

## 2018-07-10 NOTE — OP NOTE
WRIST HARDWARE REMOVAL  Procedure Report    Patient Name:  Sebastián Price  YOB: 1950    Date of Surgery:  7/10/2018     Indications:  This is a 67 year old male who presents s/p ORIF L radial shaft and DRUJ pinning on 6/12/18 for a Galeazzi fracture. Unfortunately, he experienced pin migration of his DRUJ pin and now has an open wound along the radial aspect of his distal radius with slight purulent drainage. Additionally, the pin has migrated under the skin along his ulnar aspect of his wrist, thus we discussed the risks and benefits of performing a removal of hardware with I&D of his pin tract site and assessment of his DRUJ stability with possible splinting versus pin replacement. The risks and benefits were discussed with the patient to include: bleeding, infection, nerve injury, failure of fixation, need for further procedures, loss of limb or life. The patient expressed his understanding and wished to proceed with the procedure.    Pre-op Diagnosis:      Left pin tract infection of left wrist/DRUJ  T84.7XXA    Post-op Diagnosis:       same    Procedure/CPT® Codes:  27901  73461    Procedure(s):  PIN REMOVAL LEFT WRIST with irrigation and debridement of pin site    Staff:  Surgeon(s):  Kristian Williamson Jr., MD         Anesthesia: General    Estimated Blood Loss: 20 cc    Implants:    Nothing was implanted during the procedure    Specimen:                None      Findings: pin removed and pin tract debrided thoroughly back to viable tissue    Complications: none apparent    Description of Procedure: After informed consent had been obtained, the patient was taken back to the operating suite. The patient was transferred to the operating table with a hand table extension. The patient then underwent GA and an LMA was placed. We then prepped and draped the left upper extremity in the usual, sterile fashion.     We first removed the DRUJ pin. The DRUJ was assessed for stability, and confirmed to be  stable both clinically and by a lateral view of the wrist on fluoroscopy. There was a minimal amount of purulence that did arise from the ulnar aspect of the wound upon removal of the pin. The pin site was then debrided thoroughly along the radial and ulnar aspects of the pin tract. The wound along the radial aspect of the wrist was debrided back to viable, vascular tissue. We then irrigated the wound thoroughly with antibiotic filled solution. We then applied wet to dry dressings to both the radial and ulnar wounds. A well padded, sugar tong splint was then applied.     The patient was then awakened from anesthesia, extubated, transferred to the [facility], and transferred to the post anesthesia care unit in stable condition. The plan for Mr. Price is that he will remain NWB LUE with his sugar tong splint on for 10 more days. We will keep him in house for a dressing change tomorrow and will then discharge him on PO antibiotics for 7 days.      Kristian Williamson Jr., MD     Date: 7/10/2018  Time: 11:57 AM

## 2018-07-10 NOTE — ANESTHESIA POSTPROCEDURE EVALUATION
Patient: Sebastián Price    Procedure Summary     Date:  07/10/18 Room / Location:   [facility]    Anesthesia Start:  1204 Anesthesia Stop:  1304    Procedure:  PIN REMOVAL LEFT WRIST (Left Wrist) Diagnosis:      Surgeon:  Kristian Williamson Jr., MD Provider:  Carlos Riojas MD    Anesthesia Type:  general ASA Status:  2          Anesthesia Type: general  Last vitals  BP   131/70 (07/10/18 1103)   Temp   98 °F (36.7 °C) (07/10/18 1103)   Pulse   79 (07/10/18 1103)   Resp   18 (07/10/18 1103)     SpO2   97 % (07/10/18 1103)     Post Anesthesia Care and Evaluation    Patient location during evaluation: PACU  Patient participation: complete - patient participated  Level of consciousness: sleepy but conscious  Pain management: adequate  Airway patency: patent  Anesthetic complications: No anesthetic complications  PONV Status: none  Cardiovascular status: hemodynamically stable and acceptable  Respiratory status: nonlabored ventilation, acceptable and nasal cannula  Hydration status: acceptable

## 2018-07-10 NOTE — H&P
Patient Name: Sebastián Price  MRN: [id number]  : 1950  DOS: 7/10/2018    Attending: Kristian Williamson Jr., MD    Primary Care Provider: Good Chery MD      Chief complaint:  Infected wrist pin    Subjective   Patient is a 67 y.o. male presented for left wrist pin removal by Dr. Williamson under [place]. He tolerated surgery well and is admitted for further medical management. He had wrist surgery by Dr. Williamson about 4 weeks ago. He had a pin become displaced and it began to turn red at this site with purulent drainage. He denies fevers, chills, or night sweats.    When seen postop he is doing well. His pain control is good. He denies nausea, shortness of breath or chest pain. No hx of DVT or PE.    He has parkinson's and is followed by Dr. Menchaca in [place].      ( above is noted/ agree. Seen in his room afterwards. Good pain control. No n/vom/sob. Ambulated to bathroom already. Took po diet. No n/vom/sob. )wy    Allergies:  Allergies   Allergen Reactions   • Shellfish-Derived Products Other (See Comments)     DESCRIBES IT AS A TINGLING IN HIS MOUTH - NO ASSOCIATED SWELLING     • Shrimp (Diagnostic) Other (See Comments)     DESCRIBES IT AS A TINGLING IN HIS MOUTH - NO ASSOCIATED SWELLING  IODINE AND CONTRAST CAUSES NO PROBLEMS         Meds:  Prescriptions Prior to Admission   Medication Sig Dispense Refill Last Dose   • acetaminophen (TYLENOL) 325 MG tablet Take 2 tablets by mouth Every 4 (Four) Hours As Needed for Mild Pain . 50 tablet 0 2018 at 2300   • atorvastatin (LIPITOR) 20 MG tablet Take 20 mg by mouth Daily.   2018 at 2300   • carbidopa-levodopa (SINEMET)  MG per tablet Take 3.5 tablets by mouth 3 (Three) Times a Day.   7/10/2018 at 1105   • docusate sodium (COLACE) 100 MG capsule Take 1 capsule by mouth 2 (Two) Times a Day As Needed for Constipation. 20 capsule 0 Past Week at Unknown time   • donepezil (ARICEPT) 10 MG tablet Take 10 mg by mouth Every Night.   2018 at 2300   •  "entacapone (COMTAN) 200 MG tablet Take 200 mg by mouth 5 (Five) Times a Day.   7/10/2018 at 1105   • Multiple Vitamins-Minerals (MULTIVITAL-M PO) Take 1 tablet by mouth Daily.   7/9/2018 at 0800   • ondansetron (ZOFRAN) 4 MG tablet Take 1 tablet by mouth Every 8 (Eight) Hours As Needed for Nausea or Vomiting. 12 tablet 0 Past Month at Unknown time   • QUEtiapine (SEROquel) 25 MG tablet Take 125 mg by mouth Every Night.   7/9/2018 at 2300   • oxyCODONE (ROXICODONE) 5 MG immediate release tablet Take 1-2 tablets by mouth Every 4 (Four) Hours As Needed for Moderate Pain. 40 tablet 0 7/8/2018       History:   Past Medical History:   Diagnosis Date   • Arthritis    • Elevated cholesterol    • BETI (obstructive sleep apnea)     NO CPAP   • Parkinson disease (CMS/HCC)    • Wears glasses      Past Surgical History:   Procedure Laterality Date   • COLONOSCOPY     • CYST REMOVAL      ON BACK   • DEEP BRAIN STIMULATOR PLACEMENT      placed to increase dopamine production to help reduce tremors   • HAND SURGERY Left    • ORIF ULNA/RADIUS FRACTURES Left 6/12/2018    Procedure: RADIUS SHAFT OPEN REDUCTION INTERNAL FIXATION LEFT, FELICIA UNDERWOOD;  Surgeon: Kristian Williamson Jr., MD;  Location: [facility];  Service: Orthopedics   • OTHER SURGICAL HISTORY      DBT BATTERY CHANGE - battery placed on left chest   • WISDOM TOOTH EXTRACTION       History reviewed. No pertinent family history.  Social History   Substance Use Topics   • Smoking status: Never Smoker   • Smokeless tobacco: Never Used   • Alcohol use No   He is  with 1 child. He is a retired teacher.     Review of Systems  Pertinent items are noted in HPI, all other systems reviewed and negative    Vital Signs  /78 (BP Location: Right arm, Patient Position: Lying)   Pulse 73   Temp 97.7 °F (36.5 °C) (Oral)   Resp 16   Ht 182.9 cm (72\")   Wt 101 kg (223 lb)   SpO2 95%   BMI 30.24 kg/m²     Physical Exam:    General Appearance:    Alert, cooperative, in no acute " distress   Head:    Normocephalic, without obvious abnormality, atraumatic   Eyes:            Lids and lashes normal, conjunctivae and sclerae normal, no   icterus, no pallor, corneas clear    Ears:    Ears appear intact with no abnormalities noted   Neck:   No adenopathy, supple, trachea midline, no thyromegaly    Lungs:     Clear to auscultation,respirations regular, even and                   unlabored    Heart:    Regular rhythm and normal rate, normal S1 and S2, no            murmur, no gallop    Abdomen:     Normal bowel sounds, no masses, no organomegaly, soft        non-tender, non-distended, no guarding, no rebound                 tenderness   Genitalia:    Deferred   Extremities:   LUE in sling. ACE wrap CDI. Good cap refill and movement left digits. Baseline tremors noted   Pulses:   Pulses palpable and equal bilaterally   Skin:   No bleeding, bruising or rash   Neurologic:   Cranial nerves 2 - 12 grossly intact, sensation intact      I reviewed the patient's new clinical results.         Results from last 7 days  Lab Units 07/10/18  1057   WBC 10*3/mm3 5.97   HEMOGLOBIN g/dL 13.9   HEMATOCRIT % 42.2   PLATELETS 10*3/mm3 285       Results from last 7 days  Lab Units 07/10/18  1057   SODIUM mmol/L 139   POTASSIUM mmol/L 4.3   CHLORIDE mmol/L 108   CO2 mmol/L 27.0   BUN mg/dL 13   CREATININE mg/dL 0.95   CALCIUM mg/dL 9.5   GLUCOSE mg/dL 119*     Assessment and Plan:   Principal Problem:    S/P left wrist pin removal  Active Problems:    Infection at site of external fixator pin (CMS/[facility])    HLD (hyperlipidemia)    Parkinson disease (CMS/HCC)    BETI (obstructive sleep apnea)      Plan  1. Ambulate as able  2. Pain control-prns   3. IS-encourage  4. DVT proph- [facility]  5. Bowel regimen  6. Resume home medications as appropriate  7. Monitor post-op labs  8. DC planning for home, likely tomorrow    HLD  - Continue home statin    Parkinsons  - continue home comtan and sinemet    BETI  - monitor O2 sats    Will  discuss with  postop antibiotics and possible need for a course after discharge depending on initial culture results and operative findings.wy     I have personally performed the evaluation on this patient. My history is consistant  with HPI obtained. My exam finding are listed above. I have personally reviewed and discussed the above formulated treatment plan with patient and  SARAY.wy.      SARAY Evans  07/10/18  4:45 PM

## 2018-07-10 NOTE — INTERVAL H&P NOTE
"Pre-Op H&P (See Recent Office Note Attached for Full H&P)    Review of Systems:  General ROS:  no fever, chills, rashes, No change since last office visit  Cardiovascular ROS: no chest pain or dyspnea on exertion  Respiratory ROS: no cough, shortness of breath, or wheezing    Meds:    No current facility-administered medications on file prior to encounter.      Current Outpatient Prescriptions on File Prior to Encounter   Medication Sig Dispense Refill   • acetaminophen (TYLENOL) 325 MG tablet Take 2 tablets by mouth Every 4 (Four) Hours As Needed for Mild Pain . 50 tablet 0   • atorvastatin (LIPITOR) 20 MG tablet Take 20 mg by mouth Daily.     • carbidopa-levodopa (SINEMET)  MG per tablet Take 3.5 tablets by mouth 3 (Three) Times a Day.     • docusate sodium (COLACE) 100 MG capsule Take 1 capsule by mouth 2 (Two) Times a Day As Needed for Constipation. 20 capsule 0   • donepezil (ARICEPT) 10 MG tablet Take 10 mg by mouth Every Night.     • entacapone (COMTAN) 200 MG tablet Take 200 mg by mouth 5 (Five) Times a Day.     • Multiple Vitamins-Minerals (MULTIVITAL-M PO) Take 1 tablet by mouth Daily.     • ondansetron (ZOFRAN) 4 MG tablet Take 1 tablet by mouth Every 8 (Eight) Hours As Needed for Nausea or Vomiting. 12 tablet 0   • oxyCODONE (ROXICODONE) 5 MG immediate release tablet Take 1-2 tablets by mouth Every 4 (Four) Hours As Needed for Moderate Pain. 40 tablet 0   • QUEtiapine (SEROquel) 25 MG tablet Take 125 mg by mouth Every Night.         Vital Signs:  /70 (BP Location: Right arm, Patient Position: Lying)   Pulse 79   Temp 98 °F (36.7 °C) (Temporal Artery )   Resp 18   Ht 182.9 cm (72\")   Wt 101 kg (223 lb)   SpO2 97%   BMI 30.24 kg/m²     Physical Exam:    CV:  S1S2 regular rate and rhythm, no murmur               Resp:  Clear to auscultation; respirations regular, even and unlabored    Results Review:    I reviewed the patient's new clinical results.    Cancer Staging (if " applicable)  Cancer Patient: __ yes x_no __unknown; If yes, clinical stage T:__ N:__M:__, stage group or __N/A    Daniella Blanco, APRTONO  7/10/2018   11:06 AM     Seen and agree with above note. Patient is now just over 4 weeks out from ORIF L radial shaft and DRUJ pinning for Galeazzi type fracture. He has now developed a pin site infection along his radial aspect of his distal forearm/wrist secondary to the pin migrating and wearing on his soft tissue (and cast). Today we will plan on performing a removal of pin with I&D of pin tract site. Additionally, we will assess his DRUJ for stability. Since he has now had his pin in for greater than 4 weeks, we will likely splint him with a sugar tong (as I do want to ensure stability of his DRUJ and he is at higher risk for instability secondary to his Parkinson's). If the DRUJ is grossly unstable, we might ultimately need to replace his pin at a different site. Patient posted/consented/marked for the above procedure. NPO. To OR today.

## 2018-07-11 VITALS
HEART RATE: 69 BPM | WEIGHT: 223 LBS | SYSTOLIC BLOOD PRESSURE: 135 MMHG | DIASTOLIC BLOOD PRESSURE: 70 MMHG | RESPIRATION RATE: 16 BRPM | HEIGHT: 72 IN | OXYGEN SATURATION: 96 % | TEMPERATURE: 98.2 F | BODY MASS INDEX: 30.2 KG/M2

## 2018-07-11 PROBLEM — D62 ACUTE BLOOD LOSS ANEMIA: Status: ACTIVE | Noted: 2018-07-11

## 2018-07-11 PROBLEM — G89.18 ACUTE POSTOPERATIVE PAIN: Status: ACTIVE | Noted: 2018-07-11

## 2018-07-11 LAB
ANION GAP SERPL CALCULATED.3IONS-SCNC: 4 MMOL/L (ref 3–11)
BUN BLD-MCNC: 14 MG/DL (ref 9–23)
BUN/CREAT SERPL: 17.3 (ref 7–25)
CALCIUM SPEC-SCNC: 8.9 MG/DL (ref 8.7–10.4)
CHLORIDE SERPL-SCNC: 110 MMOL/L (ref 99–109)
CO2 SERPL-SCNC: 25 MMOL/L (ref 20–31)
CREAT BLD-MCNC: 0.81 MG/DL (ref 0.6–1.3)
DEPRECATED RDW RBC AUTO: 44.9 FL (ref 37–54)
ERYTHROCYTE [DISTWIDTH] IN BLOOD BY AUTOMATED COUNT: 12.8 % (ref 11.3–14.5)
GFR SERPL CREATININE-BSD FRML MDRD: 94 ML/MIN/1.73
GLUCOSE BLD-MCNC: 143 MG/DL (ref 70–100)
HCT VFR BLD AUTO: 38.8 % (ref 38.9–50.9)
HGB BLD-MCNC: 12.6 G/DL (ref 13.1–17.5)
MCH RBC QN AUTO: 31 PG (ref 27–31)
MCHC RBC AUTO-ENTMCNC: 32.5 G/DL (ref 32–36)
MCV RBC AUTO: 95.6 FL (ref 80–99)
PLATELET # BLD AUTO: 310 10*3/MM3 (ref 150–450)
PMV BLD AUTO: 9.5 FL (ref 6–12)
POTASSIUM BLD-SCNC: 4.3 MMOL/L (ref 3.5–5.5)
RBC # BLD AUTO: 4.06 10*6/MM3 (ref 4.2–5.76)
SODIUM BLD-SCNC: 139 MMOL/L (ref 132–146)
WBC NRBC COR # BLD: 10.23 10*3/MM3 (ref 3.5–10.8)

## 2018-07-11 PROCEDURE — 94799 UNLISTED PULMONARY SVC/PX: CPT

## 2018-07-11 PROCEDURE — 85027 COMPLETE CBC AUTOMATED: CPT | Performed by: NURSE PRACTITIONER

## 2018-07-11 PROCEDURE — 25010000002 VANCOMYCIN

## 2018-07-11 PROCEDURE — 80048 BASIC METABOLIC PNL TOTAL CA: CPT | Performed by: NURSE PRACTITIONER

## 2018-07-11 PROCEDURE — G0378 HOSPITAL OBSERVATION PER HR: HCPCS

## 2018-07-11 RX ORDER — SULFAMETHOXAZOLE AND TRIMETHOPRIM 800; 160 MG/1; MG/1
1 TABLET ORAL 2 TIMES DAILY
Qty: 14 TABLET | Refills: 0 | Status: SHIPPED | OUTPATIENT
Start: 2018-07-11

## 2018-07-11 RX ORDER — OXYCODONE HYDROCHLORIDE 5 MG/1
5 TABLET ORAL EVERY 4 HOURS PRN
Qty: 10 TABLET | Refills: 0 | Status: SHIPPED | OUTPATIENT
Start: 2018-07-11

## 2018-07-11 RX ADMIN — CARBIDOPA AND LEVODOPA 3.5 TABLET: 25; 100 TABLET ORAL at 10:55

## 2018-07-11 RX ADMIN — CARBIDOPA AND LEVODOPA 3.5 TABLET: 25; 100 TABLET ORAL at 05:57

## 2018-07-11 RX ADMIN — VANCOMYCIN HYDROCHLORIDE 1500 MG: 10 INJECTION, POWDER, LYOPHILIZED, FOR SOLUTION INTRAVENOUS at 01:20

## 2018-07-11 RX ADMIN — ENTACAPONE 200 MG: 200 TABLET, FILM COATED ORAL at 05:57

## 2018-07-11 RX ADMIN — ENTACAPONE 200 MG: 200 TABLET, FILM COATED ORAL at 10:55

## 2018-07-11 NOTE — PROGRESS NOTES
Continued Stay Note  [facility]     Patient Name: Sebastián Price  MRN: [id number]  Today's Date: 7/11/2018    Admit Date: 7/10/2018          Discharge Plan     Row Name 07/11/18 1146       Plan    Plan Comments CM received call from Frank with [facility], pt is currently under home health care. New orders given to Frank and resume previous orders. Home Health will contact pt and arrange for home visit..    Row Name 07/11/18 7348       Plan    Plan Comments CM attempted to call Frank with [facility], message left to return call regarding referral.    Row Name 07/11/18 1131       Plan    Plan home with home health    Patient/Family in Agreement with Plan yes    Plan Comments CM spoke with pt and wife at bedside.Pt plans to return home with assistance from wife.  Pt and wife report pt is current with [facility] and would like to continue at discharge. Pt will require daily dressing changes for 2-3 weeks and added to home health orders. Resume home health orders and new orderes received from SARAY Evans.  Pt and wife deny further discharge needs at this time.     Final Discharge Disposition Code 06 - home with home health care              Discharge Codes    No documentation.       Expected Discharge Date and Time     Expected Discharge Date Expected Discharge Time    Jul 11, 2018             Kristal Hernandez

## 2018-07-11 NOTE — PROGRESS NOTES
Discharge Planning Assessment  [facility]     Patient Name: Sebastián Price  MRN: [id number]  Today's Date: 7/11/2018    Admit Date: 7/10/2018          Discharge Needs Assessment     Row Name 07/11/18 1134       Living Environment    Lives With spouse   pt resides in [facility]    Name(s) of Who Lives With Patient Jennifer     Current Living Arrangements home/apartment/condo    Primary Care Provided by self    Provides Primary Care For no one    Family Caregiver if Needed spouse    Quality of Family Relationships helpful;involved;supportive    Able to Return to Prior Arrangements yes       Resource/Environmental Concerns    Resource/Environmental Concerns none       Transition Planning    Patient/Family Anticipates Transition to home with help/services    Patient/Family Anticipated Services at Transition ;home health care    Transportation Anticipated family or friend will provide       Discharge Needs Assessment    Readmission Within the Last 30 Days no previous admission in last 30 days    Concerns to be Addressed no discharge needs identified;denies needs/concerns at this time    Equipment Currently Used at Home cane, straight    Anticipated Changes Related to Illness none    Equipment Needed After Discharge none    Outpatient/Agency/Support Group Needs homecare agency    Discharge Facility/Level of Care Needs home with home health    Patient's Choice of Community Agency(s) [facility], pt's wife reports he is currently receiving  care            Discharge Plan     Row Name 07/11/18 1135       Plan    Plan home with [place] health    Patient/Family in Agreement with Plan yes    Plan Comments CM spoke with pt and wife at bedside.Pt plans to return home with assistance from wife.  Pt and wife report pt is current with [facility] and would like to continue at discharge. Pt will require daily dressing changes for 2-3 weeks and added to home health orders. Resume home health orders and  new orderes received from SARAY Evans.  Pt and wife deny further discharge needs at this time.     Final Discharge Disposition Code 06 - home with home health care        Destination     No service coordination in this encounter.      Durable Medical Equipment     No service coordination in this encounter.      Dialysis/Infusion     No service coordination in this encounter.      Home Medical Care     No service coordination in this encounter.      Social Care     No service coordination in this encounter.        Expected Discharge Date and Time     Expected Discharge Date Expected Discharge Time    Jul 11, 2018               Demographic Summary     Row Name 07/11/18 1132       General Information    Referral Source admission list    General Information Comments PCP- Good Chery       Contact Information    Permission Granted to Share Info With             Functional Status     Row Name 07/11/18 1133       Functional Status    Usual Activity Tolerance moderate    Current Activity Tolerance moderate       Functional Status, IADL    Medications independent    Meal Preparation assistive person    Housekeeping assistive person    Laundry assistive person    Shopping assistive person       Employment/    Employment/ Comments pt confirms he has [employer] Medicare and denies concerns or disruption in coverage. Pt confirms he has prescription drug coverage and denies issues obtaining or affording current medications            Psychosocial    No documentation.           Abuse/Neglect    No documentation.           Legal    No documentation.           Substance Abuse    No documentation.           Patient Forms    No documentation.         Kristal Hernandez

## 2018-07-11 NOTE — PROGRESS NOTES
"Pharmacy Consult-Vancomycin Dosing  Sebastián Price is a  67 y.o. male receiving vancomycin therapy.     Indication: surgical prophylaxis skin soft tissue infection  Consulting Provider: Dr. eTri SANTAMARIA Consult: no   Duration of therapy: ?  Goal Trough:    Current Antimicrobial Therapy  Anti-Infectives       Ordered     Dose/Rate Route Frequency Start Stop    07/10/18 1853  vancomycin 1500 mg/500 mL 0.9% NS IVPB ([facility])     Ordering Provider:  Anand Stoner RPH    1,500 mg  333.3 mL/hr over 90 Minutes Intravenous Once 07/10/18 2300      07/10/18 1559  Pharmacy to dose vancomycin     Ordering Provider:  Kristian Williamson Jr., MD     Does not apply Continuous PRN 07/10/18 1559 07/11/18 2359    07/10/18 1030  vancomycin 1500 mg/500 mL 0.9% NS IVPB ([facility])     Ordering Provider:  Kritsian Williamson Jr., MD    15 mg/kg × 99.8 kg  over 90 Minutes Intravenous Once 07/10/18 1115 07/10/18 1253            Allergies  Allergies as of 07/09/2018 - Reviewed 06/12/2018   Allergen Reaction Noted   • Shellfish-derived products Other (See Comments) 06/10/2018   • Shrimp (diagnostic) Other (See Comments) 06/10/2018       Labs      Results from last 7 days     Lab Units 07/10/18  1057   BUN mg/dL 13   CREATININE mg/dL 0.95         Results from last 7 days     Lab Units 07/10/18  1057   WBC 10*3/mm3 5.97       Evaluation of Dosing     Last Dose Received in the ED/Outside Facility: na    Ht - 182.9 cm (72\")  Wt - 101 kg (223 lb)    Estimated Creatinine Clearance: 90.3 mL/min (by C-G formula based on SCr of 0.95 mg/dL).    Intake & Output (last 3 days)         07/08 0701 - 07/09 0700 07/09 0701 - 07/10 0700 07/10 0701 - 07/11 0700    I.V. (mL/kg)   350 (3.5)    Total Intake(mL/kg)   350 (3.5)    Urine (mL/kg/hr)   200    Blood   5    Total Output     205    Net     +145           Unmeasured Urine Occurrence   1 x            Microbiology and Radiology  Microbiology Results (last 10 days)       ** No results found for the last 240 hours. ** "            Evaluation of Level    No results found for: NINO CALVILLO VANCORANDOM    Assessment/Plan:  Patient received onetime dose of vancomycin 1500 mg(dose: 15 mg/kg) in preop; will give give second dose of vancomycin 1500 mg twelve hours after initial dose; will verify with surgery that no more doses will be needed(is this for surgical prophylaxis or skin/soft tissue infection)  Anand Stoner, [facility]

## 2018-07-11 NOTE — PROGRESS NOTES
Continued Stay Note  [facility]     Patient Name: Sebastián Price  MRN: [id number]  Today's Date: 7/11/2018    Admit Date: 7/10/2018          Discharge Plan     Row Name 07/11/18 1138       Plan    Plan Comments CM attempted to call Frank with [facility], message left to return call regarding referral.    Row Name 07/11/18 1135       Plan    Plan home with home health    Patient/Family in Agreement with Plan yes    Plan Comments CM spoke with pt and wife at bedside.Pt plans to return home with assistance from wife.  Pt and wife report pt is current with [facility] and would like to continue at discharge. Pt will require daily dressing changes for 2-3 weeks and added to home health orders. Resume home health orders and new orderes received from SARAY Evans.  Pt and wife deny further discharge needs at this time.     Final Discharge Disposition Code 06 - home with home health care              Discharge Codes    No documentation.       Expected Discharge Date and Time     Expected Discharge Date Expected Discharge Time    Jul 11, 2018             Kristal Hernandez

## 2018-07-11 NOTE — PROGRESS NOTES
Orthopedic Daily Progress Note      CC: JANEE overnight.    Pain well controlled  General: no fevers, chills  Abdomen: no nausea, vomiting, or diarrhea    No other complaints    Physical Exam:  I have reviewed the vital signs.  Temp:  [97.7 °F (36.5 °C)-98.5 °F (36.9 °C)] 98.2 °F (36.8 °C)  Heart Rate:  [61-89] 69  Resp:  [16-18] 16  BP: (111-143)/(59-90) 135/70    Objective  General Appearance:    Alert, cooperative, no distress  Extremities: No clubbing, cyanosis, or edema to lower extremities  Pulses:  2+ in distal surgical extremity  Skin: wound with clean/vascular bed without any surrounding erythema. WTD dressing changed on ulnar and radial wounds       Results Review:    I have reviewed the labs, radiology results and diagnostic studies:Hgb 12.6      Results from last 7 days  Lab Units 07/11/18  0446   WBC 10*3/mm3 10.23   HEMOGLOBIN g/dL 12.6*   PLATELETS 10*3/mm3 310       Results from last 7 days  Lab Units 07/11/18  0446   SODIUM mmol/L 139   POTASSIUM mmol/L 4.3   CO2 mmol/L 25.0   CREATININE mg/dL 0.81   GLUCOSE mg/dL 143*       I have reviewed the medications.    Assessment/Problem List  POD# 1 Day Post-Op   S/p removal of DRUJ pin and I&D of pin tract infection    Plan  KWASI JEFFRIES in sugar tong splint  WTD dressing changes daily. Instructed patient's wife on how to perform these this AM  Consult SW/CM for dressing supplies for daily dressing changes  Will switch back to PO Bactrim x 7 days.  Ok for d/c home from ortho standpoint. Patient has follow up with me on 7/14 already scheduled.      Discharge Planning: I expect patient to be discharged to home in 0-1 days.    Kristian Williamson Jr., MD  07/11/18  8:14 AM

## 2018-07-11 NOTE — DISCHARGE SUMMARY
Patient Name: Sebastián Price  MRN: [id number]  : 1950  DOS: 2018    Attending: Jolene Babcock MD    Primary Care Provider: Good Chery MD    Date of Admission:.7/10/2018 10:19 AM    Date of Discharge:  2018    Discharge Diagnosis: Principal Problem:    S/P left wrist pin removal  Active Problems:    Infection at site of external fixator pin (CMS/[facility])    HLD (hyperlipidemia)    Parkinson disease (CMS/[facility])    BETI (obstructive sleep apnea)    Acute blood loss anemia, mild, asymptomatic    Acute postoperative pain      Hospital Course  Patient is a 67 y.o. male presented for left wrist pin removal by Dr. Williamson under [place]. He tolerated surgery well and was admitted for further medical management. He had wrist surgery by Dr. Williamson about 4 weeks ago. He had a pin become displaced and it began to turn red at this site with purulent drainage. He denies fevers, chills, or night sweats.    He will be discharged with oral antibiotics.     He has parkinson's and is followed by Dr. Menchaca in [place].        Patient was provided pain medications as needed for pain control.  Adjustments were made to pain medications to optimize postop pain management. Risks and benefits of opiate medications discussed with patient.    The patient used an IS for atelectasis prophylaxis and mechanicals for DVT prophylaxis.  Home medications were resumed as appropriate, and labs were monitored and remained fairly stable.     With the progress he has made, he is ready for DC home today.    Discussed with patient regarding plan and he shows understanding and agreement.    Will have home health services upon discharge.      Procedures Performed  Pre-op Diagnosis:      Left pin tract infection of left wrist/DRUJ       Post-op Diagnosis:       same     Procedure/CPT® Codes:  10172     Procedure(s):  PIN REMOVAL LEFT WRIST with irrigation and debridement of pin site     Staff:  Surgeon(s):  Kristian Williamson Jr., MD    "    Pertinent Test Results:    I reviewed the patient's new clinical results.     Results from last 7 days  Lab Units 18  0446 07/10/18  1057   WBC 10*3/mm3 10.23 5.97   HEMOGLOBIN g/dL 12.6* 13.9   HEMATOCRIT % 38.8* 42.2   PLATELETS 10*3/mm3 310 285       Results from last 7 days  Lab Units 18  0446 07/10/18  1057   SODIUM mmol/L 139 139   POTASSIUM mmol/L 4.3 4.3   CHLORIDE mmol/L 110* 108   CO2 mmol/L 25.0 27.0   BUN mg/dL 14 13   CREATININE mg/dL 0.81 0.95   CALCIUM mg/dL 8.9 9.5   GLUCOSE mg/dL 143* 119*     Results for CARMELLA GARCIA (MRN [id number]) as of 2018 11:22   Ref. Range 7/10/2018 10:57   Hemoglobin Latest Ref Range: 13.1 - 17.5 g/dL 13.9   Hematocrit Latest Ref Range: 38.9 - 50.9 % 42.2     I reviewed the patient's new imaging including images and reports.      Discharge Assessment:    Vital Signs  /70 (BP Location: Right arm, Patient Position: Lying)   Pulse 69   Temp 98.2 °F (36.8 °C) (Temporal Artery )   Resp 16   Ht 182.9 cm (72\")   Wt 101 kg (223 lb)   SpO2 96%   BMI 30.24 kg/m²   Temp (24hrs), Av °F (36.7 °C), Min:97.7 °F (36.5 °C), Max:98.5 °F (36.9 °C)      General Appearance:    Alert, cooperative, in no acute distress   Lungs:     Clear to auscultation,respirations regular, even and                   unlabored    Heart:    Regular rhythm and normal rate, normal S1 and S2   Abdomen:     Normal bowel sounds, no masses, no organomegaly, soft        non-tender, non-distended, no guarding, no rebound                 tenderness   Extremities:   ACE wrap CDI. Good cap refill and movement left digits. Baseline tremors noted   Pulses:   Pulses palpable and equal bilaterally   Skin:   No bleeding, bruising or rash   Neurologic:   Cranial nerves 2 - 12 grossly intact, sensation intact       Discharge Disposition: Home    Discharge Medications     Discharge Medications      New Medications      Instructions Start Date   sulfamethoxazole-trimethoprim 800-160 MG per " tablet  Commonly known as:  BACTRIM DS,SEPTRA DS   1 tablet, Oral, 2 Times Daily         Changes to Medications      Instructions Start Date   oxyCODONE 5 MG immediate release tablet  Commonly known as:  ROXICODONE  What changed:  how much to take   5 mg, Oral, Every 4 Hours PRN         Continue These Medications      Instructions Start Date   acetaminophen 325 MG tablet  Commonly known as:  TYLENOL   650 mg, Oral, Every 4 Hours PRN      atorvastatin 20 MG tablet  Commonly known as:  LIPITOR   20 mg, Oral, Daily      carbidopa-levodopa  MG per tablet  Commonly known as:  SINEMET   3.5 tablets, Oral, 3 Times Daily      docusate sodium 100 MG capsule  Commonly known as:  COLACE   100 mg, Oral, 2 Times Daily PRN      donepezil 10 MG tablet  Commonly known as:  ARICEPT   10 mg, Oral, Nightly      entacapone 200 MG tablet  Commonly known as:  COMTAN   200 mg, Oral, 5 Times Daily      MULTIVITAL-M PO   1 tablet, Oral, Daily      ondansetron 4 MG tablet  Commonly known as:  ZOFRAN   4 mg, Oral, Every 8 Hours PRN      QUEtiapine 25 MG tablet  Commonly known as:  SEROquel   125 mg, Oral, Nightly             Discharge Diet: Regular diet    Activity at Discharge: KWASI JEFFRIES in sugar tong splint  WTD dressing changes daily. Dr. Williamson instructed patient's wife on how to perform these this AM    Follow-up Appointments  Dr. Williamson per his orders      SARAY Evans  07/11/18  11:25 AM

## 2018-07-18 ENCOUNTER — TRANSCRIBE ORDERS (OUTPATIENT)
Dept: PHYSICAL THERAPY | Facility: CLINIC | Age: 68
End: 2018-07-18

## 2018-07-18 ENCOUNTER — OFFICE VISIT (OUTPATIENT)
Dept: PHYSICAL THERAPY | Facility: CLINIC | Age: 68
End: 2018-07-18

## 2018-07-18 DIAGNOSIS — Z47.89 ORTHOPEDIC AFTERCARE: ICD-10-CM

## 2018-07-18 DIAGNOSIS — S63.592A LUNOTRIQUETRAL LIGAMENT TEAR, LEFT, INITIAL ENCOUNTER: Primary | ICD-10-CM

## 2018-07-18 DIAGNOSIS — S52.372A CLOSED GALEAZZI'S FRACTURE OF LEFT RADIUS, INITIAL ENCOUNTER: Primary | ICD-10-CM

## 2018-07-18 DIAGNOSIS — S52.302A CLOSED FRACTURE OF SHAFT OF LEFT RADIUS, UNSPECIFIED FRACTURE MORPHOLOGY, INITIAL ENCOUNTER: ICD-10-CM

## 2018-07-18 PROCEDURE — L3906 WHO W/O JOINTS CF: HCPCS | Performed by: PHYSICAL THERAPIST

## 2018-07-18 NOTE — PROGRESS NOTES
Sebastián Price 1950   Diagnosis/ Surgery: left radial shaft fracture, left Galeazzi fracture, S/P ORIF              Date Of Injury: 06/03/2018    Date Of Surgery:06/21/2018    Hand Dominance: Right  History of Present Condition: He was at home, during the night, feel while getting up.  Doesn't remember much about it.  He is S/P ORIF of mid shaft radius fracture.  He has underwent CRIF with K-pin to distal radius fracture and DRUJ dislocation.  Due to tremors related to Parkinson's disease he developed a friction wound and Pin became lose.  The K-Pin was removed, he now has a open wound at radial side of distal radius.  Sent to PT for splint fabrication to stabilize fracture during healing process and allow wound to heal.  Medical/Vocational History/ Medications: Parkinson's Disease, several falls over the past year.    Pain: 0/10    Edema: Mild to moderate at wrist  Sensibility: WNL   Wound Status:Small open circular wound around radial styloid process area.  ROM/ Strength/Test: Not assessed due to fracture precautions.    Splinting:  · Patient was measure and fit with a custom fabricated forearm based wrist/hand/thumb immobilization splint, gauntlet style.   · Patient was instructed in wearing schedule, precautions and care of the splint during this visit.   · Patient was instructed in proper donning/doffing of splint.   Assessment:  · Patient was fitted and appropriate splint was fabricated this date.  · Patient reported that splint was comfortable and had no complications with the fit of the splint.  · Patient was instructed and patient verbalized understanding of precautions, wear and care of the splint.   · Patient demonstrated independent donning/doffing of splint during treatment today.  Goals:  · Patient was fitted properly with appropriate splint for diagnosis  · Patient was educated on precautions, wear schedule and care of splint  · Patient demonstrated independence with donning/doffing of the  splint.  · Splint was provided to Protect Healing Structures, Restrict Mobility, Improve joint alignment.  Plan:  · No additional treatment is required for this patient at this time. The patient is therefore discharged from therapy.  · Patient advised to contact therapist with any additional questions or concerns regarding the fit and function of the splint.  · Patient will be seen for splint issues as needed   · Wear Instructions: Off for hygiene       PT SIGNATURE: Flaco Galaviz PT, CHT   DATE TREATMENT INITIATED: 7/18/2018    Physician Signature____________________________________ Date____________

## 2020-01-07 NOTE — ANESTHESIA PREPROCEDURE EVALUATION
Anesthesia Evaluation     Patient summary reviewed and Nursing notes reviewed   NPO Solid Status: > 8 hours  NPO Liquid Status: > 2 hours           Airway   Mallampati: III  TM distance: >3 FB  Neck ROM: full  Possible difficult intubation  Dental    (+) poor dentition    Pulmonary    (+) sleep apnea, decreased breath sounds,   Cardiovascular   Exercise tolerance: good (4-7 METS)    ECG reviewed  Rhythm: regular  Rate: normal    (+) hyperlipidemia,       Neuro/Psych  (+) dementia,     GI/Hepatic/Renal/Endo    (+) obesity,       Musculoskeletal     Abdominal   (+) obese,     Abdomen: soft.   Substance History      OB/GYN          Other   (+) arthritis     ROS/Med Hx Other: PARKINSON'S DISEASE                Anesthesia Plan    ASA 3     general and regional     intravenous induction   Anesthetic plan and risks discussed with patient.    Plan discussed with CRNA.       Yes

## 2020-07-29 ENCOUNTER — APPOINTMENT (OUTPATIENT)
Dept: GENERAL RADIOLOGY | Age: 70
End: 2020-07-29
Payer: MEDICARE

## 2020-07-29 ENCOUNTER — APPOINTMENT (OUTPATIENT)
Dept: CT IMAGING | Age: 70
End: 2020-07-29
Payer: MEDICARE

## 2020-07-29 ENCOUNTER — HOSPITAL ENCOUNTER (EMERGENCY)
Age: 70
Discharge: HOME | End: 2020-07-29
Payer: MEDICARE

## 2020-07-29 VITALS
RESPIRATION RATE: 16 BRPM | HEART RATE: 87 BPM | BODY MASS INDEX: 28.49 KG/M2 | SYSTOLIC BLOOD PRESSURE: 129 MMHG | TEMPERATURE: 98.6 F | WEIGHT: 215 LBS | OXYGEN SATURATION: 94 % | HEIGHT: 73 IN | DIASTOLIC BLOOD PRESSURE: 69 MMHG

## 2020-07-29 PROCEDURE — 73090 X-RAY EXAM OF FOREARM: CPT

## 2020-07-29 PROCEDURE — 90471 IMMUNIZATION ADMIN: CPT | Performed by: NURSE PRACTITIONER

## 2020-07-29 PROCEDURE — 6370000000 HC RX 637 (ALT 250 FOR IP): Performed by: NURSE PRACTITIONER

## 2020-07-29 PROCEDURE — 99284 EMERGENCY DEPT VISIT MOD MDM: CPT

## 2020-07-29 PROCEDURE — 6360000002 HC RX W HCPCS: Performed by: NURSE PRACTITIONER

## 2020-07-29 PROCEDURE — 70450 CT HEAD/BRAIN W/O DYE: CPT

## 2020-07-29 PROCEDURE — 73030 X-RAY EXAM OF SHOULDER: CPT

## 2020-07-29 PROCEDURE — 90715 TDAP VACCINE 7 YRS/> IM: CPT | Performed by: NURSE PRACTITIONER

## 2020-07-29 PROCEDURE — 72125 CT NECK SPINE W/O DYE: CPT

## 2020-07-29 RX ORDER — QUETIAPINE FUMARATE 100 MG/1
150 TABLET, FILM COATED ORAL NIGHTLY
COMMUNITY
Start: 2019-02-19

## 2020-07-29 RX ORDER — ENTACAPONE 200 MG/1
200 TABLET ORAL
COMMUNITY
Start: 2019-02-13

## 2020-07-29 RX ORDER — HYDROCODONE BITARTRATE AND ACETAMINOPHEN 5; 325 MG/1; MG/1
1 TABLET ORAL ONCE
Status: COMPLETED | OUTPATIENT
Start: 2020-07-29 | End: 2020-07-29

## 2020-07-29 RX ORDER — METHOCARBAMOL 500 MG/1
500 TABLET, FILM COATED ORAL 2 TIMES DAILY PRN
Qty: 10 TABLET | Refills: 0 | Status: SHIPPED | OUTPATIENT
Start: 2020-07-29 | End: 2020-08-08

## 2020-07-29 RX ORDER — CARBIDOPA AND LEVODOPA 25; 100 MG/1; MG/1
TABLET, EXTENDED RELEASE ORAL
COMMUNITY
Start: 2020-07-13

## 2020-07-29 RX ORDER — ACETAMINOPHEN 500 MG
1000 TABLET ORAL 4 TIMES DAILY PRN
Qty: 60 TABLET | Refills: 0 | Status: SHIPPED | OUTPATIENT
Start: 2020-07-29

## 2020-07-29 RX ORDER — DONEPEZIL HYDROCHLORIDE 10 MG/1
TABLET, FILM COATED ORAL
COMMUNITY
Start: 2019-10-31

## 2020-07-29 RX ADMIN — TETANUS TOXOID, REDUCED DIPHTHERIA TOXOID AND ACELLULAR PERTUSSIS VACCINE, ADSORBED 0.5 ML: 5; 2.5; 8; 8; 2.5 SUSPENSION INTRAMUSCULAR at 19:17

## 2020-07-29 RX ADMIN — HYDROCODONE BITARTRATE AND ACETAMINOPHEN 1 TABLET: 5; 325 TABLET ORAL at 19:15

## 2020-07-29 ASSESSMENT — PAIN DESCRIPTION - LOCATION: LOCATION: OTHER (COMMENT)

## 2020-07-29 ASSESSMENT — PAIN SCALES - GENERAL
PAINLEVEL_OUTOF10: 4
PAINLEVEL_OUTOF10: 4

## 2020-07-29 ASSESSMENT — PAIN DESCRIPTION - PAIN TYPE: TYPE: ACUTE PAIN

## 2020-07-29 NOTE — ED PROVIDER NOTES
[address] [place] [address]  ED  [address] [address]  Dept: [phone]  Dept Fax: [phone]  Loc: [facility] hospital        This patient was not seen or evaluated by the attending physician. I evaluated this patient, the attending physician was available for consultation. CHIEF COMPLAINT    Chief Complaint   Patient presents with   Alban Barajas     multiple falls lately, patient has parkinsons and is compliant with meds    Abrasion     scattered abrasions and bit tongue       HPI    Elvie Candelaria is a 68 y.o. male who presents with fall that occurred just PTA. The context was patient has a history of Parkinson's, has been falling more frequently over the past year or so as the Parkinson's has progressed. Supposed to be using a cane. States that he was walking, when he tripped and fell. Did hit his head, no loss of consciousness. Is not on any anticoagulants. No retrograde amnesia, or nausea or vomiting since the injury event. The patient complains of pain located in the tongue and right proximal radial ulnar region, head and generalized neck. Denies any loss of bowel or bladder, saddle anesthesia. He did bite his tongue when he fell but no seizure-like activity. The severity of the pain is 8/10. The pain is aggravated by movement. The patient was brought to the ED for further evaluation and treatment. REVIEW OF SYSTEMS    Neurologic: no LOC, no Head injury  Cardiac: No Chest Pain, no syncope  Respiratory: No difficulty breathing  GI: No abdominal pain  Musculoskeletal: see HPI  All other systems reviewed and are negative. PAST MEDICAL & SURGICAL HISTORY    Past Medical History:   Diagnosis Date    Parkinson's disease ([address]      No past surgical history on file.     CURRENT MEDICATIONS  (may include discharge medications prescribed in the ED)      ALLERGIES    No Known Allergies    SOCIAL & FAMILY HISTORY    Social History Socioeconomic History    Marital status:      Spouse name: Not on file    Number of children: Not on file    Years of education: Not on file    Highest education level: Not on file   Occupational History    Not on file   Social Needs    Financial resource strain: Not on file    Food insecurity     Worry: Not on file     Inability: Not on file    Transportation needs     Medical: Not on file     Non-medical: Not on file   Tobacco Use    Smoking status: Never Smoker    Smokeless tobacco: Never Used   Substance and Sexual Activity    Alcohol use: Yes     Comment: shot once a week    Drug use: Never    Sexual activity: Not on file   Lifestyle    Physical activity     Days per week: Not on file     Minutes per session: Not on file    Stress: Not on file   Relationships    Social connections     Talks on phone: Not on file     Gets together: Not on file     Attends Christianity service: Not on file     Active member of club or organization: Not on file     Attends meetings of clubs or organizations: Not on file     Relationship status: Not on file    Intimate partner violence     Fear of current or ex partner: Not on file     Emotionally abused: Not on file     Physically abused: Not on file     Forced sexual activity: Not on file   Other Topics Concern    Not on file   Social History Narrative    Not on file     No family history on file. PHYSICAL EXAM    VITAL SIGNS: /69   Pulse 87   Temp 98.6 °F (37 °C) (Oral)   Resp 16   Ht 6' 1\" (1.854 m)   Wt 215 lb (97.5 kg)   SpO2 94%   BMI 28.37 kg/m²    Constitutional:  Well developed, well nourished, no acute distress   HENT:  traumatic with scattered facial abrasions, none of which are extending past the epidermal layer or bleeding at this time, no trismus. Pupils 3 mm and Suzy bilaterally. No hemotympanum bilaterally.   No webber signs or raccoon eyes  Neck: supple, no JVD, generalized posterior neck tenderness  Respiratory:  Lungs afebrile and nontoxic in appearance. CT and Plain films as above. No evidence of neurovascular injury on exam.  His tetanus was updated here in the ED. Upon reevaluation the patient is resting comfortably. I do believe that he is stable for discharge home. I spoke to patient and wife about obtaining a walker given his progression of the Parkinson's disease. They verbalized understanding as well as strict return parameters. They have no further questions or concerns. He remained afebrile and hemodynamically stable throughout his entire ED course and will be discharged home in stable condition. The patient was instructed to follow up as an outpatient in 2 days. The patient was instructed to return to the ED immediately for any new or worsening symptoms. The patient verbalized understanding. FINAL IMPRESSION    1. Fall, initial encounter    2. Closed head injury, initial encounter    3. Multiple contusions    4.  Multiple abrasions        PLAN  Discharge with outpatient instructions and follow-up (See EMR)      (Please note that this note was completed with a voice recognition program.  Every attempt was made to edit the dictations, but inevitably there remain words that are mis-transcribed.)            ADRI Schwartz - EAN  07/29/20 2039

## 2020-07-29 NOTE — ED NOTES
Pt is identified as a positive fall risk on the Dewanda Cage Scale. Pt placed in fall precautions which include: yellow fall armband on wrist, yellow socks on feet, \"Be Safe\" sign placed on patient's door, and bed alarm placed under patient/alarm turned on. Pt instructed on the importance of not getting out of bed and calling for assistance for safety.         Alex Talbert RN  07/29/20 [id number]

## 2020-07-29 NOTE — ED NOTES

## 2020-07-30 NOTE — ED NOTES
Verbal and written discharge instructions given. Patient accompanied to car via wheelchair. Patient in stable condition, discharged home with wife.       Ba Urbina RN  07/29/20 2025

## 2021-05-12 ENCOUNTER — HOSPITAL ENCOUNTER (EMERGENCY)
Age: 71
Discharge: HOME | End: 2021-05-12
Payer: MEDICARE

## 2021-05-12 ENCOUNTER — APPOINTMENT (OUTPATIENT)
Dept: GENERAL RADIOLOGY | Age: 71
End: 2021-05-12
Payer: MEDICARE

## 2021-05-12 VITALS
DIASTOLIC BLOOD PRESSURE: 73 MMHG | TEMPERATURE: 97.4 F | BODY MASS INDEX: 28.37 KG/M2 | SYSTOLIC BLOOD PRESSURE: 165 MMHG | WEIGHT: 215 LBS | RESPIRATION RATE: 16 BRPM | HEART RATE: 53 BPM | OXYGEN SATURATION: 100 %
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
## 2021-05-12 PROCEDURE — 73060 X-RAY EXAM OF HUMERUS: CPT

## 2021-05-12 PROCEDURE — 73030 X-RAY EXAM OF SHOULDER: CPT

## 2021-05-12 PROCEDURE — 99283 EMERGENCY DEPT VISIT LOW MDM: CPT

## 2021-05-12 ASSESSMENT — PAIN DESCRIPTION - PAIN TYPE: TYPE: ACUTE PAIN

## 2021-05-12 ASSESSMENT — PAIN DESCRIPTION - LOCATION: LOCATION: ARM

## 2021-05-12 ASSESSMENT — PAIN SCALES - GENERAL
PAINLEVEL_OUTOF10: 5
PAINLEVEL_OUTOF10: 2

## 2021-05-12 ASSESSMENT — PAIN DESCRIPTION - ORIENTATION: ORIENTATION: LEFT

## 2021-05-12 NOTE — ED NOTES
Pt instructed to follow up with [facility]. Assessed per Jaswant TRUJILLO.      Dede Balbuena LPN  72/76/89 [phone]

## 2021-05-12 NOTE — ED NOTES
Fall. Pt states \"I fell at home today landed on my left arm did not hit my head\". Pt right hand dominant. Pt has full ROM in arm/elbow.       Saman Block, ALEXN  45/59/57 [phone]

## 2021-05-12 NOTE — ED NOTES

## 2021-05-12 NOTE — ED NOTES
Pt brother waiting on in [facility] vestibule for pt.      Sherwin Harris, LPN  62/31/81 [phone]

## 2021-05-12 NOTE — ED PROVIDER NOTES
[facility] Emergency Department    CHIEF COMPLAINT  Fall (states fell today in kitchen landing on L arm, states lost balance, hx parkinsons, no LOC or thinners ) and Arm Injury      SHARED SERVICE VISIT:  Evaluated by CORNEL. My supervising physician was available for consultation. HISTORY OF PRESENT ILLNESS  Joanna Irvin is a 65 y.o. male who presents to the ED complaining of left arm pain after a fall. The patient has a history of Parkinson's disease, with frequent falls. Today he was in the kitchen when he tripped and fell striking his left arm against the countertop. He rates his pain as a 2 out of 10 primarily located on his upper arm. Denies numbness or tingling. Denies wrist pain, elbow pain, and shoulder pain. He is not taken anything for the pain today. He does admit to previous falls with previous injuries including to the left shoulder. No other complaints, modifying factors or associated symptoms. Nursing notes reviewed. Past Medical History:   Diagnosis Date    Parkinson's disease [facility])      History reviewed. No pertinent surgical history. History reviewed. No pertinent family history.   Social History     Socioeconomic History    Marital status:      Spouse name: Not on file    Number of children: Not on file    Years of education: Not on file    Highest education level: Not on file   Occupational History    Not on file   Social Needs    Financial resource strain: Not on file    Food insecurity     Worry: Not on file     Inability: Not on file    Transportation needs     Medical: Not on file     Non-medical: Not on file   Tobacco Use    Smoking status: Never Smoker    Smokeless tobacco: Never Used   Substance and Sexual Activity    Alcohol use: Yes     Comment: shot once a week    Drug use: Never    Sexual activity: Not on file   Lifestyle    Physical activity     Days per week: Not on file     Minutes per session: Not on file    Stress: Not on file   Relationships    Social connections     Talks on phone: Not on file     Gets together: Not on file     Attends Anabaptism service: Not on file     Active member of club or organization: Not on file     Attends meetings of clubs or organizations: Not on file     Relationship status: Not on file    Intimate partner violence     Fear of current or ex partner: Not on file     Emotionally abused: Not on file     Physically abused: Not on file     Forced sexual activity: Not on file   Other Topics Concern    Not on file   Social History Narrative    Not on file     No current facility-administered medications for this encounter. Current Outpatient Medications   Medication Sig Dispense Refill    entacapone (COMTAN) 200 MG tablet Take 200 mg by mouth 5 times daily      carbidopa-levodopa (SINEMET)  MG per tablet 3.5 tabs 5x/day, 3 tabs during the night Indications: Idiopathic Parkinsonism      carbidopa-levodopa (SINEMET CR)  MG per extended release tablet TAKE TWO TABLETS BY MOUTH EVERY NIGHT AT BEDTIME      QUEtiapine (SEROQUEL) 100 MG tablet Take 150 mg by mouth nightly      donepezil (ARICEPT) 10 MG tablet TAKE 1 TABLET DAILY      Multiple Vitamin (MULTI-VITAMIN PO) Take 1 tablet by mouth daily      acetaminophen (TYLENOL) 500 MG tablet Take 2 tablets by mouth 4 times daily as needed for Pain 60 tablet 0     Allergies   Allergen Reactions    Shellfish-Derived Products Hives and Rash       REVIEW OF SYSTEMS  6 systems reviewed, pertinent positives per HPI otherwise noted to be negative    PHYSICAL EXAM  /88   Pulse 66   Temp 97.4 °F (36.3 °C) (Oral)   Resp 18   Wt 215 lb (97.5 kg)   SpO2 98%   BMI 28.37 kg/m²   GENERAL APPEARANCE: Awake and alert. Cooperative. No acute distress. HEAD: Normocephalic. Atraumatic. EYES: PERRL. EOM's grossly intact. ENT: Mucous membranes are moist.   NECK: Supple. Normal ROM. CHEST: Equal symmetric chest rise.    LUNGS: questions were answered. Patient will follow up with  Ortho for further evaluation/treatment. Patient will return to ED for new/worsening symptoms. MDM  No results found for this visit on 05/12/21. I estimate there is LOW risk for COMPARTMENT SYNDROME, DEEP VENOUS THROMBOSIS, SEPTIC ARTHRITIS, TENDON OR NEUROVASCULAR INJURY, thus I consider the discharge disposition reasonable. Watson Linn and I have discussed the diagnosis and risks, and we agree with discharging home to follow-up with their primary doctor or the referral orthopedist. We also discussed returning to the Emergency Department immediately if new or worsening symptoms occur. We have discussed the symptoms which are most concerning (e.g., changing or worsening pain, numbness, weakness) that necessitate immediate return. Final Impression    1. Fall, initial encounter        Blood pressure (!) 165/73, pulse 53, temperature 97.4 °F (36.3 °C), temperature source Oral, resp. rate 16, weight 215 lb (97.5 kg), SpO2 100 %. DISPOSITION  Patient was discharged to home in good condition.             Carol Edge, [address]  05/12/21 [phone]

## 2021-05-12 NOTE — ED NOTES
I attempted to call pt brother to come back in ED room with pt/no answer via phone call.       Allison Espinoza LPN  76/84/24 6613

## (undated) DEVICE — SUCTION CANISTER, 2500CC, RIGID: Brand: DEROYAL

## (undated) DEVICE — SUT ETHLN 3/0 PC5 18IN 1893G

## (undated) DEVICE — UNDERCAST PADDING: Brand: DEROYAL

## (undated) DEVICE — DRSNG GZ PETROLTM XEROFORM CURAD 1X8IN STRL

## (undated) DEVICE — GLV SURG TRIUMPH ORTHO W/ALOE PF LTX 8 STRL

## (undated) DEVICE — BNDG ELAS ELITE V/CLOSE 4IN 5YD LF STRL

## (undated) DEVICE — SOL LR 1000ML

## (undated) DEVICE — GLV SURG SENSICARE MICRO PF LF 8 STRL

## (undated) DEVICE — SNAP KOVER: Brand: UNBRANDED

## (undated) DEVICE — GOWN,REINF,POLY,ECL,PP SLV,XL: Brand: MEDLINE

## (undated) DEVICE — GW THRD SPADE PT NO/COLR 1.6X150MM

## (undated) DEVICE — CVR HNDL LT SURG ACCSSRY BLU STRL

## (undated) DEVICE — ANTIBACTERIAL UNDYED BRAIDED (POLYGLACTIN 910), SYNTHETIC ABSORBABLE SUTURE: Brand: COATED VICRYL

## (undated) DEVICE — BANDAGE,GAUZE,BULKEE II,4.5"X4.1YD,STRL: Brand: MEDLINE

## (undated) DEVICE — AMD ANTIMICROBIAL GAUZE SPONGES,12 PLY USP TYPE VII, 0.2% POLYHEXAMETHYLENE BIGUANIDE HCI (PHMB): Brand: CURITY

## (undated) DEVICE — PAD CAST SOF ROL NS 4IN

## (undated) DEVICE — SUT MNCRYL 3/0 SH 27IN DYED Y316H

## (undated) DEVICE — SPNG GZ STRL 2S 4X4 12PLY

## (undated) DEVICE — CANNULA,OXY,ADULT,SUPERSOFT,W/7'TUB,UC: Brand: MEDLINE

## (undated) DEVICE — SUT SILK 2/0 TIES 18IN A185H

## (undated) DEVICE — DRN PENRS 1/4X18IN LTX

## (undated) DEVICE — BIT DRL QC DIA 2.5X110MM

## (undated) DEVICE — ARM SLING: Brand: DEROYAL

## (undated) DEVICE — PK EXTREM UPPR 10

## (undated) DEVICE — DRSNG GZ CURAD XEROFORM NONADHR OVERWRAP 5X9IN

## (undated) DEVICE — AIRWY 90MM NO9

## (undated) DEVICE — SCRW CORT S/TAP 3.5X14MM
Type: IMPLANTABLE DEVICE | Site: RADIUS | Status: NON-FUNCTIONAL
Removed: 2018-06-12

## (undated) DEVICE — 1010 S-DRAPE TOWEL DRAPE 10/BX: Brand: STERI-DRAPE™